# Patient Record
Sex: MALE | Race: WHITE | Employment: FULL TIME | URBAN - METROPOLITAN AREA
[De-identification: names, ages, dates, MRNs, and addresses within clinical notes are randomized per-mention and may not be internally consistent; named-entity substitution may affect disease eponyms.]

---

## 2017-12-18 ENCOUNTER — OFFICE VISIT (OUTPATIENT)
Dept: FAMILY MEDICINE CLINIC | Age: 57
End: 2017-12-18

## 2017-12-18 VITALS
HEIGHT: 65 IN | RESPIRATION RATE: 12 BRPM | OXYGEN SATURATION: 95 % | DIASTOLIC BLOOD PRESSURE: 88 MMHG | TEMPERATURE: 98 F | WEIGHT: 189.2 LBS | BODY MASS INDEX: 31.52 KG/M2 | SYSTOLIC BLOOD PRESSURE: 147 MMHG | HEART RATE: 62 BPM

## 2017-12-18 DIAGNOSIS — Z12.11 SCREENING FOR COLON CANCER: ICD-10-CM

## 2017-12-18 DIAGNOSIS — Z00.01 ENCOUNTER FOR WELL ADULT EXAM WITH ABNORMAL FINDINGS: Primary | ICD-10-CM

## 2017-12-18 DIAGNOSIS — M25.512 CHRONIC LEFT SHOULDER PAIN: ICD-10-CM

## 2017-12-18 DIAGNOSIS — Z13.29 SCREENING FOR THYROID DISORDER: ICD-10-CM

## 2017-12-18 DIAGNOSIS — Z13.1 SCREENING FOR DIABETES MELLITUS: ICD-10-CM

## 2017-12-18 DIAGNOSIS — D22.9 ATYPICAL NEVI: ICD-10-CM

## 2017-12-18 DIAGNOSIS — Z12.5 SCREENING FOR MALIGNANT NEOPLASM OF PROSTATE: ICD-10-CM

## 2017-12-18 DIAGNOSIS — Z13.220 SCREENING FOR HYPERLIPIDEMIA: ICD-10-CM

## 2017-12-18 DIAGNOSIS — Z13.0 SCREENING FOR DEFICIENCY ANEMIA: ICD-10-CM

## 2017-12-18 DIAGNOSIS — G89.29 CHRONIC LEFT SHOULDER PAIN: ICD-10-CM

## 2017-12-18 LAB
A-G RATIO,AGRAT: 1.7 RATIO (ref 1.1–2.6)
ALBUMIN SERPL-MCNC: 4.8 G/DL (ref 3.5–5)
ALP SERPL-CCNC: 68 U/L (ref 25–115)
ALT SERPL-CCNC: 28 U/L (ref 5–40)
ANION GAP SERPL CALC-SCNC: 16 MMOL/L
AST SERPL W P-5'-P-CCNC: 24 U/L (ref 10–37)
BILIRUB SERPL-MCNC: 0.3 MG/DL (ref 0.2–1.2)
BUN SERPL-MCNC: 11 MG/DL (ref 6–22)
CALCIUM SERPL-MCNC: 10.3 MG/DL (ref 8.4–10.4)
CHLORIDE SERPL-SCNC: 99 MMOL/L (ref 98–110)
CHOLEST SERPL-MCNC: 224 MG/DL (ref 110–200)
CO2 SERPL-SCNC: 24 MMOL/L (ref 20–32)
CREAT SERPL-MCNC: 0.7 MG/DL (ref 0.5–1.2)
GFRAA, 66117: >60
GFRNA, 66118: >60
GLOBULIN,GLOB: 2.8 G/DL (ref 2–4)
GLUCOSE SERPL-MCNC: 94 MG/DL (ref 70–99)
HCT VFR BLD AUTO: 44.2 % (ref 39.3–51.6)
HDLC SERPL-MCNC: 44 MG/DL (ref 40–59)
HGB BLD-MCNC: 15.2 G/DL (ref 13.1–17.2)
LDLC SERPL CALC-MCNC: 119 MG/DL (ref 50–99)
POTASSIUM SERPL-SCNC: 4.6 MMOL/L (ref 3.5–5.5)
PROT SERPL-MCNC: 7.6 G/DL (ref 6.4–8.3)
PSA SERPL-MCNC: 0.42 NG/ML
SODIUM SERPL-SCNC: 139 MMOL/L (ref 133–145)
TRIGL SERPL-MCNC: 309 MG/DL (ref 40–149)
TSH SERPL DL<=0.005 MIU/L-ACNC: 3.36 MCU/ML (ref 0.27–4.2)
VLDLC SERPL CALC-MCNC: 62 MG/DL (ref 8–30)

## 2017-12-18 NOTE — PROGRESS NOTES
1. Have you been to the ER, urgent care clinic since your last visit? Hospitalized since your last visit? No    2. Have you seen or consulted any other health care providers outside of the [address] [place] [address] since your last visit? Include any pap smears or colon screening. No      Subjective:   Forrest Ayon is a 65 y.o. male presenting for his annual checkup. ROS:  Feeling well. No dyspnea or chest pain on exertion. No abdominal pain, change in bowel habits, black or bloody stools. No urinary tract or prostatic symptoms. No neurological complaints. Specific concerns today: left shoulder pain. He fell this summer and it continues to hurt. He also has this mole that has changed shape and gotten bigger. .    There are no active problems to display for this patient. Not on File  Past Medical History:   Diagnosis Date    Hiatal hernia      Past Surgical History:   Procedure Laterality Date    HX HERNIA REPAIR      hiatal     Family History   Problem Relation Age of Onset    Cancer Father     Heart Disease Brother     Breast Cancer Maternal Grandmother      Social History   Substance Use Topics    Smoking status: Former Smoker     Packs/day: 2.00     Years: 20.00     Types: Cigarettes    Smokeless tobacco: Current User     Types: Chew    Alcohol use Yes      Comment: social             Objective:     Visit Vitals    /88 (BP 1 Location: Left arm, BP Patient Position: Sitting)    Pulse 62    Temp 98 °F (36.7 °C) (Oral)    Resp 12    Ht 5' 5\" (1.651 m)    Wt 189 lb 3.2 oz (85.8 kg)    SpO2 95%    BMI 31.48 kg/m2     The patient appears well, alert, oriented x 3, in no distress. ENT normal.  Neck supple. No adenopathy or thyromegaly. MARIAH. Lungs are clear, good air entry, no wheezes, rhonchi or rales. S1 and S2 normal, no murmurs, regular rate and rhythm. Abdomen is soft without tenderness, guarding, mass or organomegaly.  exam:  abdominal hernias.   Extremities show no edema, normal peripheral pulses. Neurological is normal without focal findings. Physical Exam   Musculoskeletal:        Right shoulder: Normal.        Left shoulder: He exhibits decreased range of motion, tenderness, effusion, crepitus, pain and decreased strength. Skin: Lesion and rash noted. No purpura noted. Rash is papular. Rash is not pustular and not vesicular. skin: on his back, right side, lateral aspect there is a nevi with a pendulous lesion that is callous. He has multiple actinic keratosis. Assessment/Plan:   healthy adult male  lose weight, increase physical activity, routine labs ordered. ICD-10-CM ICD-9-CM    1. Encounter for well adult exam with abnormal findings Z00.01 V70.0    2. Screening for colon cancer Z12.11 V76.51 REFERRAL TO UROLOGY   3. Screening for hyperlipidemia Z13.220 V77.91 LIPID PANEL      REFERRAL TO UROLOGY   4. Screening for deficiency anemia Z13.0 V78.1 HGB & HCT      REFERRAL TO UROLOGY   5. Screening for malignant neoplasm of prostate Z12.5 V76.44 PSA, DIAGNOSTIC (PROSTATE SPECIFIC AG)      REFERRAL TO UROLOGY   6. Screening for thyroid disorder Z13.29 V77.0 TSH 3RD GENERATION      REFERRAL TO UROLOGY   7. Screening for diabetes mellitus C78.4 K37.6 METABOLIC PANEL, COMPREHENSIVE      REFERRAL TO UROLOGY   8. Chronic left shoulder pain M25.512 719.41 REFERRAL TO ORTHOPEDICS    G89.29 338.29 MRI SHOULDER LT WO CONT   . PLAN  We discussed his left shoulder injury, symptoms and ref to ortho. Pt is aware that the MRI has to be approved by his insurance. We discussed diet and exercise. Pt given after visit summary.

## 2017-12-18 NOTE — MR AVS SNAPSHOT
Visit Information Date & Time Provider Department Dept. Phone Encounter #  
 12/18/2017 10:30 AM Sherry Piper NP [address] [place] [address] [id number] Upcoming Health Maintenance Date Due Hepatitis C Screening 1960 DTaP/Tdap/Td series (1 - Tdap) 7/5/1982 FOBT Q 1 YEAR AGE 50-75 7/5/2011 Influenza Age 5 to Adult 8/1/2017 Allergies as of 12/18/2017  Review Complete On: 12/18/2017 By: Sherry Piper NP Not on File Current Immunizations  Never Reviewed No immunizations on file. Not reviewed this visit You Were Diagnosed With   
  
 Codes Comments Encounter for well adult exam with abnormal findings    -  Primary ICD-10-CM: Z00.01 
ICD-9-CM: V70.0 Screening for colon cancer     ICD-10-CM: Z12.11 ICD-9-CM: V76.51 Screening for hyperlipidemia     ICD-10-CM: Z13.220 ICD-9-CM: V77.91 Screening for deficiency anemia     ICD-10-CM: Z13.0 ICD-9-CM: V78.1 Screening for malignant neoplasm of prostate     ICD-10-CM: Z12.5 ICD-9-CM: V76.44 Screening for thyroid disorder     ICD-10-CM: Z13.29 ICD-9-CM: V77.0 Screening for diabetes mellitus     ICD-10-CM: Z13.1 ICD-9-CM: V77.1 Chronic left shoulder pain     ICD-10-CM: M25.512, G89.29 ICD-9-CM: 719.41, 338.29 Vitals BP Pulse Temp Resp Height(growth percentile) Weight(growth percentile) 147/88 (BP 1 Location: Left arm, BP Patient Position: Sitting) 62 98 °F (36.7 °C) (Oral) 12 5' 5\" (1.651 m) 189 lb 3.2 oz (85.8 kg) SpO2 BMI Smoking Status 95% 31.48 kg/m2 Former Smoker BMI and BSA Data Body Mass Index Body Surface Area  
 31.48 kg/m 2 1.98 m 2 Your Updated Medication List  
  
Notice  As of 12/18/2017 11:05 AM  
 You have not been prescribed any medications. We Performed the Following REFERRAL TO ORTHOPEDICS [NZO739 Custom] REFERRAL TO UROLOGY [JUW358 Custom] To-Do List   
 12/18/2017 Lab:  HGB & HCT   
  
 12/18/2017 Lab:  LIPID PANEL   
  
 12/18/2017 Lab:  METABOLIC PANEL, COMPREHENSIVE   
  
 12/18/2017 Imaging:  MRI SHOULDER LT WO CONT   
  
 12/18/2017 Lab:  PSA, DIAGNOSTIC (PROSTATE SPECIFIC AG)   
  
 12/18/2017 Lab:  TSH 3RD GENERATION Referral Information Referral ID Referred By Referred To  
  
 [id number] [facility] GABY [address]   
   [facility], [address] Phone: [phone] Fax: [phone] Visits Status Start Date End Date 1 New Request 12/18/17 12/18/18 If your referral has a status of pending review or denied, additional information will be sent to support the outcome of this decision. Referral ID Referred By Referred To  
 [id number] [facility] [address] [facility] [place], [address] Phone: [phone] Fax: [phone] Visits Status Start Date End Date 1 New Request 12/18/17 12/18/18 If your referral has a status of pending review or denied, additional information will be sent to support the outcome of this decision. Introducing [facility]! [facility] introduces Xeround patient portal. Now you can access parts of your medical record, email your doctor's office, and request medication refills online. 1. In your internet browser, go to [email or web address] 2. Click on the First Time User? Click Here link in the Sign In box. You will see the New Member Sign Up page. 3. Enter your Xeround Access Code exactly as it appears below. You will not need to use this code after youve completed the sign-up process. If you do not sign up before the expiration date, you must request a new code. · Xeround Access Code: [email or web address] Expires: 3/18/2018 10:30 AM 
 
 4. Enter the last four digits of your Social Security Number (xxxx) and Date of Birth (mm/dd/yyyy) as indicated and click Submit. You will be taken to the next sign-up page. 5. Create a XipLink ID. This will be your XipLink login ID and cannot be changed, so think of one that is secure and easy to remember. 6. Create a XipLink password. You can change your password at any time. 7. Enter your Password Reset Question and Answer. This can be used at a later time if you forget your password. 8. Enter your e-mail address. You will receive e-mail notification when new information is available in [address] 9. Click Sign Up. You can now view and download portions of your medical record. 10. Click the Download Summary menu link to download a portable copy of your medical information. If you have questions, please visit the Frequently Asked Questions section of the XipLink website. Remember, XipLink is NOT to be used for urgent needs. For medical emergencies, dial 911. Now available from your iPhone and Android! Please provide this summary of care documentation to your next provider. If you have any questions after today's visit, please call [phone].

## 2017-12-19 ENCOUNTER — TELEPHONE (OUTPATIENT)
Dept: FAMILY MEDICINE CLINIC | Age: 57
End: 2017-12-19

## 2017-12-19 NOTE — TELEPHONE ENCOUNTER
----- Message from Michael Moody LPN sent at 55/57/2706  9:52 AM EST -----      ----- Message -----     From: Sylvia Fernandez NP     Sent: 12/19/2017   9:20 AM       To: [facility] Nurses    Please advise Pt that there is no signs of anemia. His kidney and liver functions are fine, no signs of diabetes. His TSH is in normal range. His PSA is normal.  His cholesterol numbers are off. His triglycerides are 309, his total cholesterol is 224 and his LDL is 119. I want him to focus on his triglycerides. He should eat more foods rich in Omega 3 and Niacin and walk more. He should eat less foods that are fried and fatty. I would like to see him in 6 months for an OV and fasting labs.

## 2017-12-19 NOTE — LETTER
12/19/2017 11:08 AM 
 
Mr. Leroy Acuna [address] [place] [id number] We are writing to inform you that your labs are normal except your cholesterol numbers are off.  triglycerides are 309, your total cholesterol is 224 and his LDL is 119. Keith would you like you to focus on your triglycerides. You should eat more foods rich in Omega 3 and Niacin and walk more. You should eat less foods that are fried and fatty. Aung Jones would like to see you in 6 months for an OV and fasting labs Sincerely, Vin Castaneda LPN

## 2017-12-19 NOTE — PROGRESS NOTES
Please advise Pt that there is no signs of anemia. His kidney and liver functions are fine, no signs of diabetes. His TSH is in normal range. His PSA is normal.  His cholesterol numbers are off. His triglycerides are 309, his total cholesterol is 224 and his LDL is 119. I want him to focus on his triglycerides. He should eat more foods rich in Omega 3 and Niacin and walk more. He should eat less foods that are fried and fatty. I would like to see him in 6 months for an OV and fasting labs.

## 2017-12-29 ENCOUNTER — HOSPITAL ENCOUNTER (OUTPATIENT)
Age: 57
Discharge: HOME | End: 2017-12-29
Attending: NURSE PRACTITIONER
Payer: COMMERCIAL

## 2017-12-29 DIAGNOSIS — G89.29 CHRONIC LEFT SHOULDER PAIN: ICD-10-CM

## 2017-12-29 DIAGNOSIS — M25.512 CHRONIC LEFT SHOULDER PAIN: ICD-10-CM

## 2017-12-29 PROCEDURE — 73221 MRI JOINT UPR EXTREM W/O DYE: CPT

## 2018-01-02 NOTE — PROGRESS NOTES
Please advise Pt that his MRI showed a large full thickness tear of the supraspinator and the infraspinator. I ref him to ortho.

## 2018-01-03 ENCOUNTER — TELEPHONE (OUTPATIENT)
Dept: FAMILY MEDICINE CLINIC | Age: 58
End: 2018-01-03

## 2018-01-03 NOTE — LETTER
1/11/2018 11:22 AM 
 
Mr. Monique Fletcher [address] [address] We are having trouble getting in contact with you. Please call our office at [phone]. Sincerely, Esperanza Kerns LPN

## 2018-01-03 NOTE — TELEPHONE ENCOUNTER
----- Message from Brody Harris NP sent at 1/2/2018  1:28 PM EST -----  Please advise Pt that his MRI showed a large full thickness tear of the supraspinator and the infraspinator. I ref him to ortho.

## 2018-01-10 NOTE — TELEPHONE ENCOUNTER
----- Message from Felicita Rayo NP sent at 1/2/2018  1:28 PM EST -----  Please advise Pt that his MRI showed a large full thickness tear of the supraspinator and the infraspinator. I ref him to ortho.

## 2018-01-11 NOTE — TELEPHONE ENCOUNTER
----- Message from Rajeev Cruz NP sent at 1/2/2018  1:28 PM EST -----  Please advise Pt that his MRI showed a large full thickness tear of the supraspinator and the infraspinator. I ref him to ortho.

## 2018-01-16 ENCOUNTER — OFFICE VISIT (OUTPATIENT)
Dept: ORTHOPEDIC SURGERY | Age: 58
End: 2018-01-16

## 2018-01-16 VITALS
BODY MASS INDEX: 31.49 KG/M2 | WEIGHT: 189 LBS | OXYGEN SATURATION: 94 % | TEMPERATURE: 97.6 F | DIASTOLIC BLOOD PRESSURE: 89 MMHG | HEIGHT: 65 IN | SYSTOLIC BLOOD PRESSURE: 140 MMHG | HEART RATE: 72 BPM

## 2018-01-16 DIAGNOSIS — M75.02 ADHESIVE CAPSULITIS OF LEFT SHOULDER: ICD-10-CM

## 2018-01-16 DIAGNOSIS — M75.122 COMPLETE TEAR OF LEFT ROTATOR CUFF: Primary | ICD-10-CM

## 2018-01-16 RX ORDER — TRIAMCINOLONE ACETONIDE 40 MG/ML
40 INJECTION, SUSPENSION INTRA-ARTICULAR; INTRAMUSCULAR ONCE
Qty: 1 ML | Refills: 0
Start: 2018-01-16 | End: 2018-01-16

## 2018-01-16 NOTE — PROGRESS NOTES
Clarissa Mcgovern  1960   Chief Complaint   Patient presents with    Shoulder Pain     LEFT        HISTORY OF PRESENT ILLNESS  Clarissa Mcgovern is a 65 y.o. male who presents today for evaluation of left shoulder pain. he rates his pain 7/10 today. Pain has been present since 7/31/17 when he had a fall over a pipe. Patient describes the pain as aching and sharp that is Constant in nature. Symptoms are worse with certain movements of the arm such as overhead motions, Activity and is better with  Rest. Associated symptoms include clicking, stiffness. Since problem started, it: has worsened. Pain does wake patient up at night. Has taken no recent medications for the problem. Has tried following treatments: Injections:NO; Brace:NO; Therapy:NO; Cane/Crutch:NO       Allergies   Allergen Reactions    Pcn [Penicillins] Hives        Past Medical History:   Diagnosis Date    Hiatal hernia       Social History     Social History    Marital status: SINGLE     Spouse name: N/A    Number of children: N/A    Years of education: N/A     Occupational History    Not on file. Social History Main Topics    Smoking status: Former Smoker     Packs/day: 2.00     Years: 20.00     Types: Cigarettes    Smokeless tobacco: Current User     Types: Chew    Alcohol use Yes      Comment: social    Drug use: No    Sexual activity: Yes     Partners: Female     Birth control/ protection: None     Other Topics Concern    Not on file     Social History Narrative      Past Surgical History:   Procedure Laterality Date    HX HERNIA REPAIR      hiatal      Family History   Problem Relation Age of Onset    Cancer Father     Heart Disease Brother     Breast Cancer Maternal Grandmother       Current Outpatient Prescriptions   Medication Sig    triamcinolone acetonide (KENALOG) 40 mg/mL injection 1 mL by IntraMUSCular route once for 1 dose. No current facility-administered medications for this visit.         REVIEW OF SYSTEM   Patient denies: Weight loss, Fever/Chills, HA, Visual changes, Fatigue, Chest pain, SOB, Abdominal pain, N/V/D/C, Blood in stool or urine, Edema. Pertinent positive as above in HPI. All others were negative    PHYSICAL EXAM:   Visit Vitals    /89    Pulse 72    Temp 97.6 °F (36.4 °C) (Oral)    Ht 5' 5\" (1.651 m)    Wt 189 lb (85.7 kg)    SpO2 94%    BMI 31.45 kg/m2     The patient is a well-developed, well-nourished male   in no acute distress. The patient is alert and oriented times three. The patient is alert and oriented times three. Mood and affect are normal.  LYMPHATIC: lymph nodes are not enlarged and are within normal limits  SKIN: normal in color and non tender to palpation. There are no bruises or abrasions noted. NEUROLOGICAL: Motor sensory exam is within normal limits. Reflexes are equal bilaterally. There is normal sensation to pinprick and light touch  MUSCULOSKELETAL:  Examination Left shoulder   Skin Intact   AC joint tenderness -   Biceps tenderness -   Forward flexion/Elevation ROM 90   Active abduction    Glenohumeral abduction 60   External rotation ROM 30   Internal rotation ROM 0   Apprehension -   Robinsons Relocation -   Jerk -   Load and Shift -   Obriens -   Speeds -   Impingement sign +   Supraspinatus/Empty Can +   External Rotation Strength -, 5/5   Lift Off/Belly Press -, 5/5   Neurovascular Intact     PROCEDURE: After sterile prep, 6 cc of Xylocaine and 1 cc of Kenalog were injected into the left shoulder.        [facility]  OFFICE PROCEDURE PROGRESS NOTE        Chart reviewed for the following:  Mario Agudelo MD, have reviewed the History, Physical and updated the Allergic reactions for [address] [place] [address] performed immediately prior to start of procedure:  aMrio Agudelo MD, have performed the following reviews on Harris Fields prior to the start of the procedure: * Patient was identified by name and date of birth   * Agreement on procedure being performed was verified  * Risks and Benefits explained to the patient  * Procedure site verified and marked as necessary  * Patient was positioned for comfort  * Consent was signed and verified     Time: 11:26 AM    Date of procedure: 1/16/2018    Procedure performed by:  Bishop Charlotte MD    Provider assisted by: (see medication administration)    How tolerated by patient: tolerated the procedure well with no complications    Comments: none      IMAGING: MRI of the left shoulder dated 12/29/17 was reviewed and read:   IMPRESSION:  1. Large full-thickness tear of the supraspinatus and infraspinatus with  moderate atrophy and 3.3 cm of retraction. 2.  Tendinopathy of the subscapularis. IMPRESSION:      ICD-10-CM ICD-9-CM    1. Complete tear of left rotator cuff M75.122 727.61 REFERRAL TO PHYSICAL THERAPY      TRIAMCINOLONE ACETONIDE INJ      triamcinolone acetonide (KENALOG) 40 mg/mL injection      DRAIN/INJECT LARGE JOINT/BURSA   2. Adhesive capsulitis of left shoulder M75.02 726.0 REFERRAL TO PHYSICAL THERAPY      TRIAMCINOLONE ACETONIDE INJ      triamcinolone acetonide (KENALOG) 40 mg/mL injection      DRAIN/INJECT LARGE JOINT/BURSA        PLAN:  1. The patient has developed an adhesive capsulitis in the left shoulder. I would like him to work on ROM before we can proceed with surgery to repair the MRI documented rotator cuff tear. Risk factors include: n/a  2. Yes cortisone injection indicated today L SHOULDER  3. Yes Physical Therapy indicated today  4. No diagnostic test indicated today  5. No durable medical equipment indicated today  6. No referral indicated today   7. No medications indicated today  8.  No Narcotic indicated today       RTC 4 weeks  Follow-up Disposition: Not on File    Scribed by [facility]) as dictated by Bishop Charlotte MD    I, Dr. Bishop Porter, confirm that all documentation is accurate.     Luis Medley M.D.   [facility]

## 2018-01-19 ENCOUNTER — HOSPITAL ENCOUNTER (OUTPATIENT)
Dept: PHYSICAL THERAPY | Age: 58
Discharge: HOME | End: 2018-01-19
Payer: COMMERCIAL

## 2018-01-19 PROCEDURE — 97110 THERAPEUTIC EXERCISES: CPT

## 2018-01-19 PROCEDURE — 97161 PT EVAL LOW COMPLEX 20 MIN: CPT

## 2018-01-19 NOTE — PROGRESS NOTES
[facility]  [address]  [place], [address]  [phone] fax    Plan of Care/ Statement of Necessity for Physical Therapy Services    Patient name: Harris Fields Start of Care: 2018   Referral source: Ramila Charles,* : 1960    Medical Diagnosis: Left shoulder pain [M25.512]   Onset Date:2017    Treatment Diagnosis: L shoulder adhesive capsulitis and RTC tear   Prior Hospitalization: see medical history Provider#: [id number]   Medications: Verified on Patient summary List    Comorbidities: + smoker   Prior Level of Function: functionally I with daily activities     The Plan of Care and following information is based on the information from the initial evaluation. Assessment/ key information: 64 y/o male presents with c/o L shoulder pain and weakness. Pt reports slipping and falling at work in July and landing on the L shoulder. He has continued to work since that time but reports he has not been able to lift his L arm overhead. MRI was performed and shows RTC tear per pt report. He also now demonstrates signs and symptoms consistent with adhesive capsulitis. L shoulder AROM and PROM is restricted in all planes of motion. + capsular tightness noted L GHJ. Pt will benefit from PT to improve GHJ mobility and ROM of the L shoulder. He will most likely require surgery for RTC pathology. Evaluation Complexity History MEDIUM  Complexity : 1-2 comorbidities / personal factors will impact the outcome/ POC ; Examination MEDIUM Complexity : 3 Standardized tests and measures addressing body structure, function, activity limitation and / or participation in recreation  ;Presentation MEDIUM Complexity : Evolving with changing characteristics  ; Clinical Decision Making MEDIUM Complexity : FOTO score of 26-74  Overall Complexity Rating: MEDIUM  Problem List: pain affecting function, decrease ROM, decrease strength, decrease ADL/ functional abilitiies, decrease activity tolerance and decrease flexibility/ joint mobility   Treatment Plan may include any combination of the following: Therapeutic exercise, Therapeutic activities, Neuromuscular re-education, Physical agent/modality, Manual therapy, Patient education and Self Care training  Patient / Family readiness to learn indicated by: asking questions, trying to perform skills and interest  Persons(s) to be included in education: patient (P)  Barriers to Learning/Limitations: None  Patient Goal (s): normal mobility  Patient Self Reported Health Status: good  Rehabilitation Potential: fair    Short Term Goals: To be accomplished in 1 weeks:   1. Pt will be I and compliant with HEP  Long Term Goals: To be accomplished in 4 weeks:   1. Improve FOTO to predicted outcome for improved ability for functional tasks   2. Improve L shoulder PROM flexion to 130 degrees for improve ability for future tasks   3. Improve L shoulder PROM ER to 45 degrees for improved ability for use of L UE   4. Improve L shoulder PROM IR to 60 degrees for improved ability for ADL. Frequency / Duration: Patient to be seen 2-3 times per week for 4 weeks. Patient/ Caregiver education and instruction: Diagnosis, prognosis, self care and exercises   [x]  Plan of care has been reviewed with PTA    Amanda Alvarez, PT 1/19/2018 4:28 PM    ________________________________________________________________________    I certify that the above Therapy Services are being furnished while the patient is under my care. I agree with the treatment plan and certify that this therapy is necessary.     [de-identified] Signature:____________________  Date:____________Time: _________    Please sign and return to [facility] [place] [address] [address]  [place], [address]  [phone] fax

## 2018-01-19 NOTE — PROGRESS NOTES
PT DAILY TREATMENT NOTE - [facility]     Patient Name: Jesus Isabel  Date:2018  : 1960  [x]  Patient  Verified  Payor: Elizabeth Burr / Plan: [facility] [employer] / Product Type: Commerical /    In time:3:40  Out time:4:20  Total Treatment Time (min): 40  Total Timed Codes (min): 10  1:1 Treatment Time ( only): 40   Visit #: 1 of     Treatment Area: Left shoulder pain [M25.512]    SUBJECTIVE  Pain Level (0-10 scale): 4  Any medication changes, allergies to medications, adverse drug reactions, diagnosis change, or new procedure performed?: [x] No    [] Yes (see summary sheet for update)  Subjective functional status/changes:   [] No changes reported       OBJECTIVE    Modality rationale:    Min Type Additional Details    [] Estim:  []Unatt       []IFC  []Premod                        []Other:  []w/ice   []w/heat  Position:  Location:    [] Estim: []Att    []TENS instruct  []NMES                    []Other:  []w/US   []w/ice   []w/heat  Position:  Location:    []  Traction: [] Cervical       []Lumbar                       [] Prone          []Supine                       []Intermittent   []Continuous Lbs:  [] before manual  [] after manual    []  Ultrasound: []Continuous   [] Pulsed                           []1MHz   []3MHz W/cm2:  Location:    []  Iontophoresis with dexamethasone         Location: [] Take home patch   [] In clinic    []  Ice     []  heat  []  Ice massage  []  Laser   []  Anodyne Position:  Location:    []  Laser with stim  []  Other:  Position:  Location:    []  Vasopneumatic Device Pressure:       [] lo [] med [] hi   Temperature: [] lo [] med [] hi   [] Skin assessment post-treatment:  []intact []redness- no adverse reaction    []redness - adverse reaction:     30 min [x]Eval                  []Re-Eval       10 min Therapeutic Exercise:  [] See flow sheet : HEP   Rationale: increase ROM to improve the patients ability to improve use of L UE            With   [] TE   [] TA   [] neuro   [] other: Patient Education: [x] Review HEP    [] Progressed/Changed HEP based on:   [] positioning   [] body mechanics   [] transfers   [] heat/ice application    [] other:      Other Objective/Functional Measures:       Pain Level (0-10 scale) post treatment: 4    ASSESSMENT/Changes in Function:      Patient will continue to benefit from skilled PT services to modify and progress therapeutic interventions, address functional mobility deficits, address ROM deficits, address strength deficits, analyze and address soft tissue restrictions, analyze and cue movement patterns and analyze and modify body mechanics/ergonomics to attain remaining goals.      []  See Plan of Care  []  See progress note/recertification  []  See Discharge Summary         Progress towards goals / Updated goals:  Per POC    PLAN  []  Upgrade activities as tolerated     [x]  Continue plan of care  []  Update interventions per flow sheet       []  Discharge due to:_  []  Other:_      Kathleen Laureano, PT 1/19/2018  4:19 PM    Future Appointments  Date Time Provider Syeda Manzanares   1/23/2018 2:30 PM Sandy Camp PTA [facility]   1/26/2018 4:00 PM Kathleen Laureano, PT [facility]   1/30/2018 4:00 PM Sandy Camp PTA [facility]   2/2/2018 4:00 PM Vickie Cortés, PTA [facility]   2/5/2018 1:30 PM Mendoza Avila MD [address] [place] [address]   2/6/2018 5:00 PM Sandy Camp PTA [facility]   2/9/2018 4:00 PM Vickie Cortés, PTA [facility]   2/13/2018 4:00 PM Sandy Camp PTA [facility]   2/16/2018 3:30 PM Kathleen Laureano, PT [facility]

## 2018-01-23 ENCOUNTER — HOSPITAL ENCOUNTER (OUTPATIENT)
Dept: PHYSICAL THERAPY | Age: 58
Discharge: HOME | End: 2018-01-23
Payer: COMMERCIAL

## 2018-01-23 PROCEDURE — 97140 MANUAL THERAPY 1/> REGIONS: CPT

## 2018-01-23 PROCEDURE — 97110 THERAPEUTIC EXERCISES: CPT

## 2018-01-23 NOTE — PROGRESS NOTES
PT DAILY TREATMENT NOTE     Patient Name: Azael Gacria  Date:2018  : 1960  [x]  Patient  Verified  Payor: Sandeep Guillen / Plan: [facility] / Product Type: Commerical /    In time:2:30  Out time:3:20  Total Treatment Time (min): 50  Visit #: 2 of     Treatment Area: Left shoulder pain [M25.512]    SUBJECTIVE  Pain Level (0-10 scale): 5/10  Any medication changes, allergies to medications, adverse drug reactions, diagnosis change, or new procedure performed?: [x] No    [] Yes (see summary sheet for update)  Subjective functional status/changes:   [x] No changes reported     OBJECTIVE     42 min Therapeutic Exercise:  [x] See flow sheet :   Rationale: increase ROM, increase strength and increase proprioception to improve the patients ability to perform ADL's.    8 min Manual Therapy:  (L) ST/GH joint mobs, pec release, shoulder PROM. Rationale: decrease pain, increase ROM and increase tissue extensibility to improve ease of performing ADL's. With   [x] TE   [] TA   [] neuro   [] other: Patient Education: [x] Review HEP    [] Progressed/Changed HEP based on:   [] positioning   [] body mechanics   [] transfers   [] heat/ice application    [] other:      Other Objective/Functional Measures: Initiated exercises per flow sheet. Pain Level (0-10 scale) post treatment: 2/10    ASSESSMENT/Changes in Function: First F/U visit. PT reported a decrease in tension and pain level post-treatment. Patient will continue to benefit from skilled PT services to modify and progress therapeutic interventions, address functional mobility deficits, address ROM deficits, address strength deficits, analyze and address soft tissue restrictions and analyze and modify body mechanics/ergonomics to attain remaining goals. [x]  See Plan of Care  []  See progress note/recertification  []  See Discharge Summary         Progress towards goals / Updated goals:  Short Term Goals:  To be accomplished in 1 weeks: 1. Pt will be I and compliant with HEP - Pt reports HEP compliance. 1/23/2018  Long Term Goals: To be accomplished in 4 weeks:                         1. Improve FOTO to predicted outcome for improved ability for functional tasks                         2. Improve L shoulder PROM flexion to 130 degrees for improve ability for future tasks                         3. Improve L shoulder PROM ER to 45 degrees for improved ability for use of L UE                         4. Improve L shoulder PROM IR to 60 degrees for improved ability for ADL.    Frequency / Duration: Patient to be seen 2-3 times per week for 4 weeks.     PLAN  []  Upgrade activities as tolerated     [x]  Continue plan of care  []  Update interventions per flow sheet       []  Discharge due to:_  []  Other:_      Guy Khan PTA 1/23/2018  2:23 PM    Future Appointments  Date Time Provider Syeda Manzanares   1/23/2018 2:30 PM Guy Khan PTA [facility]   1/26/2018 4:00 PM Javier Brewer, PT [facility]   1/30/2018 4:00 PM Guy Khan PTA [facility]   2/2/2018 4:00 PM Analy David, PTA [facility]   2/5/2018 1:30 PM Chen Cote MD [address] [place] [address]   2/6/2018 5:00 PM Guy Khan PTA [facility]   2/9/2018 4:00 PM Analy MAGDIEL Hernandez [facility]   2/13/2018 4:00 PM Guy Khan PTA [facility]   2/16/2018 3:30 PM Javier Brewer, PT [facility]

## 2018-01-26 ENCOUNTER — HOSPITAL ENCOUNTER (OUTPATIENT)
Dept: PHYSICAL THERAPY | Age: 58
Discharge: HOME | End: 2018-01-26
Payer: COMMERCIAL

## 2018-01-26 PROCEDURE — 97140 MANUAL THERAPY 1/> REGIONS: CPT

## 2018-01-26 PROCEDURE — 97110 THERAPEUTIC EXERCISES: CPT

## 2018-01-26 NOTE — PROGRESS NOTES
PT DAILY TREATMENT NOTE 3-16    Patient Name: Theophilus Harada  Date:2018  : 1960  [x]  Patient  Verified  Payor: Trev Pitts / Plan: [facility] Product Type: Commerical /    In time:3:30  Out time:4:07  Total Treatment Time (min): 37  Visit #: 3 of 8-12    Treatment Area: Left shoulder pain [M25.512]    SUBJECTIVE  Pain Level (0-10 scale): 1  Any medication changes, allergies to medications, adverse drug reactions, diagnosis change, or new procedure performed?: [x] No    [] Yes (see summary sheet for update)  Subjective functional status/changes:   [] No changes reported  Patient reports no new complaints. OBJECTIVE  29 min Therapeutic Exercise:  [x] See flow sheet :   Rationale: increase ROM, increase strength and improve coordination to improve the patients ability to increase ease with ADLs    8 min Manual Therapy:  Left STJ mobs, left grade II inferior/posterior GHJ mobs, PROM left shoulder   Rationale: decrease pain, increase ROM and increase tissue extensibility to ease ADL tolerance           With   [] TE   [] TA   [] neuro   [] other: Patient Education: [x] Review HEP    [] Progressed/Changed HEP based on:   [] positioning   [] body mechanics   [] transfers   [] heat/ice application    [] other:      Other Objective/Functional Measures:   Cues to decrease muscle guarding with manual     Pain Level (0-10 scale) post treatment: 2    ASSESSMENT/Changes in Function:   Improved range post manual. Pain is low today. Patient will continue to benefit from skilled PT services to modify and progress therapeutic interventions, address functional mobility deficits, address ROM deficits, address strength deficits, analyze and address soft tissue restrictions, analyze and cue movement patterns, analyze and modify body mechanics/ergonomics and assess and modify postural abnormalities to attain remaining goals.      []  See Plan of Care  []  See progress note/recertification  []  See Discharge Summary         Progress towards goals / Updated goals:  Short Term Goals: To be accomplished in 1 weeks:                         1. Pt will be I and compliant with HEP - Pt reports HEP compliance. 1/23/2018  Long Term Goals: To be accomplished in 4 weeks:                         0. Improve FOTO to predicted outcome for improved ability for functional tasks                         2. Improve L shoulder PROM flexion to 130 degrees for improve ability for future tasks                         3. Improve L shoulder PROM ER to 45 degrees for improved ability for use of L UE                         4. Improve L shoulder PROM IR to 60 degrees for improved ability for ADL.          PLAN  []  Upgrade activities as tolerated     [x]  Continue plan of care  []  Update interventions per flow sheet       []  Discharge due to:_  []  Other:_      Luis Da Silva 1/26/2018  3:23 PM    Future Appointments  Date Time Provider Syeda Manzanares   1/26/2018 3:30 PM Luis Da Silva [facility]   1/30/2018 4:00 PM Asuncion Howard PTA [facility]   2/2/2018 4:00 PM Natalie Dover PTA [facility]   2/5/2018 1:30 PM Talita Fierro MD [address] [place] [address]   2/6/2018 5:00 PM Asuncion Howard PTA [facility]   2/9/2018 4:00 PM Natalie Dover PTA [facility]   2/13/2018 4:00 PM Asuncion Howard PTA [facility]   2/16/2018 3:30 PM Shelly Presley, LUZ MARINA [facility]

## 2018-01-30 ENCOUNTER — HOSPITAL ENCOUNTER (OUTPATIENT)
Dept: PHYSICAL THERAPY | Age: 58
Discharge: HOME | End: 2018-01-30
Payer: COMMERCIAL

## 2018-01-30 PROCEDURE — 97140 MANUAL THERAPY 1/> REGIONS: CPT

## 2018-01-30 PROCEDURE — 97110 THERAPEUTIC EXERCISES: CPT

## 2018-01-30 NOTE — PROGRESS NOTES
PT DAILY TREATMENT NOTE     Patient Name: Moreno Josue  Date:2018  : 1960  [x]  Patient  Verified  Payor: Yolanda Millard / Plan: [facility] [employer]  [facility] PT / Product Type: Commerical /    In time:3:59  Out time:4:54  Total Treatment Time (min): 55  Visit #: 4 of     Treatment Area: Left shoulder pain [M25.512]    SUBJECTIVE  Pain Level (0-10 scale): 310  Any medication changes, allergies to medications, adverse drug reactions, diagnosis change, or new procedure performed?: [x] No    [] Yes (see summary sheet for update)  Subjective functional status/changes:   [] No changes reported  \"I can it's better, feeling a bit looser. \"    OBJECTIVE    47 min Therapeutic Exercise:  [x] See flow sheet :   Rationale: increase ROM, increase strength and increase proprioception to improve the patients ability to perform ADL's.    8 min Manual Therapy:  (L) ST/GH joint mobs, pec release, DTM (L) UT, deltoid. Shoulder PROM. Rationale: decrease pain, increase ROM, increase tissue extensibility and decrease trigger points to improve activity tolerance. With   [x] TE   [] TA   [] neuro   [] other: Patient Education: [x] Review HEP    [] Progressed/Changed HEP based on:   [] positioning   [] body mechanics   [] transfers   [] heat/ice application    [] other:      Other Objective/Functional Measures: PROM (L) Shoulder flexion 125 degrees. Pain Level (0-10 scale) post treatment: 3/10    ASSESSMENT/Changes in Function: Occasional cavitations during (L) shoulder PROM, pt stated \"feels better after the pop's. \"    Patient will continue to benefit from skilled PT services to modify and progress therapeutic interventions, address functional mobility deficits, address ROM deficits, address strength deficits, analyze and address soft tissue restrictions and analyze and modify body mechanics/ergonomics to attain remaining goals.      [x]  See Plan of Care  []  See progress note/recertification  []  See Discharge Summary         Progress towards goals / Updated goals:  Short Term Goals: To be accomplished in 1 weeks:                         1. Pt will be I and compliant with HEP - Pt reports HEP compliance. 1/23/2018  Long Term Goals: To be accomplished in 4 weeks:                         0. Improve FOTO to predicted outcome for improved ability for functional tasks                         2. Improve L shoulder PROM flexion to 130 degrees for improve ability for future tasks - Progressing, 125 degrees. 1/30/2018                         3. Improve L shoulder PROM ER to 45 degrees for improved ability for use of L UE                         4. Improve L shoulder PROM IR to 60 degrees for improved ability for ADL.      PLAN  []  Upgrade activities as tolerated     [x]  Continue plan of care  []  Update interventions per flow sheet       []  Discharge due to:_  []  Other:_      Solis Khan PTA 1/30/2018  4:09 PM    Future Appointments  Date Time Provider Syeda Manzanares   2/2/2018 4:00 PM Maria Elena Ayon PTA [facility]   2/5/2018 1:30 PM Heidy Ochoa MD [address] [place] [address]   2/6/2018 5:00 PM Solis Khan PTA [facility]   2/9/2018 4:00 PM Maria Elena Ayon PTA [facility]   2/13/2018 4:00 PM Solis Khan PTA [facility]   2/16/2018 3:30 PM Stacie Dang, PT [facility]

## 2018-02-02 ENCOUNTER — HOSPITAL ENCOUNTER (OUTPATIENT)
Dept: PHYSICAL THERAPY | Age: 58
Discharge: HOME | End: 2018-02-02
Payer: COMMERCIAL

## 2018-02-02 PROCEDURE — 97140 MANUAL THERAPY 1/> REGIONS: CPT

## 2018-02-02 PROCEDURE — 97110 THERAPEUTIC EXERCISES: CPT

## 2018-02-02 NOTE — PROGRESS NOTES
PT DAILY TREATMENT NOTE     Patient Name: Silvina Stake  Date:2018  : 1960  [x]  Patient  Verified  Payor: Joseline Crespo / [facility] Product Type: Commerical /    In time:4:00  Out time:4:40  Total Treatment Time (min): 40  Visit #: 5 of     Treatment Area: Left shoulder pain [M25.512]    SUBJECTIVE  Pain Level (0-10 scale): 2-3/10  Any medication changes, allergies to medications, adverse drug reactions, diagnosis change, or new procedure performed?: [x] No    [] Yes (see summary sheet for update)  Subjective functional status/changes:   [] No changes reported  Pt reports no new complaints. Pt reports compliance with HEP. OBJECTIVE    32 min Therapeutic Exercise:  [x] See flow sheet :   Rationale: increase ROM and increase strength to improve the patients ability to tolerate ADLs. 8 min Manual Therapy:  PROM L shoulder   Rationale: decrease pain, increase ROM and increase tissue extensibility to improve tolerance to ADLs. With   [] TE   [] TA   [] neuro   [] other: Patient Education: [x] Review HEP    [] Progressed/Changed HEP based on:   [] positioning   [] body mechanics   [] transfers   [] heat/ice application    [] other:      Other Objective/Functional Measures: Pt guarding with all PROM. Pain Level (0-10 scale) post treatment: 0/10    ASSESSMENT/Changes in Function: Pt has continued improved available PROM but continues to have pain at end range. Patient will continue to benefit from skilled PT services to modify and progress therapeutic interventions, address functional mobility deficits, address ROM deficits, address strength deficits, analyze and address soft tissue restrictions, analyze and cue movement patterns and analyze and modify body mechanics/ergonomics to attain remaining goals.      []  See Plan of Care  []  See progress note/recertification  []  See Discharge Summary         Progress towards goals / Updated goals:  Short Term Goals: To be accomplished in [address] [place] [address]:                         1. Pt will be I and compliant with HEP - Pt reports HEP compliance. 1/23/2018  Long Term Goals: To be accomplished in 4 weeks:                         2. Improve FOTO to predicted outcome for improved ability for functional tasks                         2. Improve L shoulder PROM flexion to 130 degrees for improve ability for future tasks - Progressing, 125 degrees. 1/30/2018                         3. Improve L shoulder PROM ER to 45 degrees for improved ability for use of L UE                         4. Improve L shoulder PROM IR to 60 degrees for improved ability for ADL.      PLAN  []  Upgrade activities as tolerated     [x]  Continue plan of care  []  Update interventions per flow sheet       []  Discharge due to:_  []  Other:_      Felix Valadez PTA 2/2/2018  4:15 PM    Future Appointments  Date Time Provider [facility]   2/5/2018 1:30 PM Anyi Davies MD [address] [place] [address]   2/6/2018 5:00 PM Erica Zamora PTA [facility]   2/9/2018 4:00 PM Felix Valadez PTA [facility]   2/13/2018 4:00 PM Erica Zamora PTA [facility]   2/16/2018 3:30 PM Elizabeth Slater PT [facility]

## 2018-02-05 ENCOUNTER — OFFICE VISIT (OUTPATIENT)
Dept: UROLOGY | Age: 58
End: 2018-02-05

## 2018-02-05 VITALS
HEIGHT: 65 IN | HEART RATE: 79 BPM | BODY MASS INDEX: 31.16 KG/M2 | SYSTOLIC BLOOD PRESSURE: 136 MMHG | OXYGEN SATURATION: 96 % | WEIGHT: 187 LBS | DIASTOLIC BLOOD PRESSURE: 73 MMHG

## 2018-02-05 DIAGNOSIS — N40.0 BENIGN PROSTATIC HYPERPLASIA WITHOUT LOWER URINARY TRACT SYMPTOMS: Primary | ICD-10-CM

## 2018-02-05 LAB
BILIRUB UR QL STRIP: NEGATIVE
GLUCOSE UR-MCNC: NEGATIVE MG/DL
KETONES P FAST UR STRIP-MCNC: NEGATIVE MG/DL
PH UR STRIP: 7.5 [PH] (ref 4.6–8)
PROT UR QL STRIP: NEGATIVE
SP GR UR STRIP: 1.01 (ref 1–1.03)
UA UROBILINOGEN AMB POC: NORMAL (ref 0.2–1)
URINALYSIS CLARITY POC: CLEAR
URINALYSIS COLOR POC: YELLOW
URINE BLOOD POC: NEGATIVE
URINE LEUKOCYTES POC: NEGATIVE
URINE NITRITES POC: NEGATIVE

## 2018-02-05 NOTE — PATIENT INSTRUCTIONS
Urine Test: About This Test  What is it? A urine test checks the color, clarity (clear or cloudy), odor, concentration, and acidity (pH) of your urine. It also checks your levels of protein, sugar, blood cells, or other substances in your urine. This test is sometimes called a urinalysis. Why is this test done? A urine test may be done:  · To check for a disease or infection of the urinary tract. The urinary tract includes the kidneys, the tubes that carry urine from the kidneys to the bladder (ureters), and the bladder. It also includes the tube that carries urine from the bladder to outside the body (urethra). · To check the treatment of conditions such as diabetes, kidney stones, a urinary tract infection (UTI), high blood pressure, or some kidney or liver diseases. How can you prepare for the test?  · Before the test, don't eat foods that can change the color of your urine. Examples of these include blackberries, beets, and rhubarb. · Don't do heavy exercise before the test.  · Tell your doctor if you are menstruating or close to starting your period. Your doctor may want to wait to do the test.  · Tell your doctor about all the nonprescription and prescription medicines and herbs or other supplements you take. Some of these can affect the results of this test.  What happens during the test?  A urine test can be done in your doctor's office, clinic, or lab. Or you may be asked to collect a urine sample at home. Then you can take it to the office or lab for testing. Clean-catch midstream urine collection  · Wash your hands before you start. · If the cup you are given has a lid, remove it carefully. Set it down with the inner surface up. Don't touch the inside of the cup with your fingers. · Clean the area around your genitals. ¨ For men: Pull back the foreskin, if present. Clean the head of your penis with medicated towelettes or swabs.   ¨ For women: Spread open the genital folds of skin with one hand. Then use medicated towelettes or swabs in your other hand to clean the area where urine comes out (the urethra). Wipe the area from front to back. · Start urinating into the toilet or urinal. A woman should hold apart the genital folds of skin while she urinates. · After the urine has flowed for several seconds, place the cup into the urine stream. Collect about 2 ounces of urine without stopping your flow of urine. · Don't touch the rim of the cup to your genital area. Don't get toilet paper, pubic hair, stool (feces), menstrual blood, or anything else in the urine sample. · Finish urinating into the toilet or urinal.  · Carefully replace and tighten the lid on the cup, and then return it to the lab. If you are collecting the urine at home and can't get it to the lab in an hour, refrigerate it. Double-voided urine sample collection  This method collects the urine your body is making right now. · Urinate into the toilet or urinal. Don't collect any of this urine. · Drink a large glass of water, and wait about 30 to 40 minutes. · Then get a urine sample. Follow the instructions above for collecting a clean-catch urine sample. · Take the urine sample to the lab. If you are collecting the urine at home and can't get it to the lab in an hour, refrigerate it. Follow-up care is a key part of your treatment and safety. Be sure to make and go to all appointments, and call your doctor if you are having problems. It's also a good idea to keep a list of the medicines you take. Ask your doctor when you can expect to have your test results. Where can you learn more? Go to http://kenna-marline.info/. Enter R266 in the search box to learn more about \"Urine Test: About This Test.\"  Current as of: October 14, 2016  Content Version: 11.4  © 5830-8007 [employer], [employer].  Care instructions adapted under license by [employer] (which disclaims liability or warranty for this information). If you have questions about a medical condition or this instruction, always ask your healthcare professional. [place] [address] any warranty or liability for your use of this information.

## 2018-02-05 NOTE — PROGRESS NOTES
Chief Complaint   Patient presents with    New Patient       HISTORY OF PRESENT ILLNESS:  Kaveh Licea is a 65 y.o. male who presents today for prostate exam and evaluation. He has been followed by his primary care physician and a PSA has been reported as 0.4. He has no significant history of past prostate or urinary difficulties and has had nobody in the family that he is aware of with prostate cancer. He is on no medication and really has no significant symptoms. He said he was checked here by someone in this office about 10 or 15 years ago. Today he has nocturia 0-1, no hesitancy, hematuria, or episodes of retention. ROS unremarkable and already dictated on the chart. Past Medical History:   Diagnosis Date    Hiatal hernia        Past Surgical History:   Procedure Laterality Date    HX HERNIA REPAIR      hiatal       Social History   Substance Use Topics    Smoking status: Former Smoker     Packs/day: 2.00     Years: 20.00     Types: Cigarettes    Smokeless tobacco: Current User     Types: Chew    Alcohol use Yes      Comment: social       Allergies   Allergen Reactions    Pcn [Penicillins] Hives       Family History   Problem Relation Age of Onset    Cancer Father     Heart Disease Brother     Breast Cancer Maternal Grandmother              PHYSICAL EXAMINATION:   Visit Vitals    /73 (BP 1 Location: Left arm, BP Patient Position: Sitting)    Pulse 79    Ht 5' 5\" (1.651 m)    Wt 187 lb (84.8 kg)    SpO2 96%    BMI 31.12 kg/m2     Constitutional: WDWN, Pleasant and appropriate affect, No acute distress. CV:  No peripheral swelling noted  Respiratory: No respiratory distress or difficulties  Abdomen:  No abdominal masses or tenderness. No CVA tenderness. No inguinal hernias noted.     Male:    BRENT:Perineum normal to visual inspection, no erythema or irritation, he has normal anal sphincter tone with no rectal lesions or blood, the prostate is about 30 g in size but with no nodules or induration, generally benign in consistency. SCROTUM:  No scrotal rash or lesions noticed. Normal bilateral testes and epididymis. PENIS: Urethral meatus normal in location and size. No urethral discharge. Skin: No jaundice. Neuro/Psych:  Alert and oriented x 3, affect appropriate. Lymphatic:   No enlarged inguinal lymph nodes. Results for orders placed or performed in visit on 02/05/18   AMB POC URINALYSIS DIP STICK AUTO W/O MICRO   Result Value Ref Range    Color (UA POC) Yellow     Clarity (UA POC) Clear     Glucose (UA POC) Negative Negative    Bilirubin (UA POC) Negative Negative    Ketones (UA POC) Negative Negative    Specific gravity (UA POC) 1.015 1.001 - 1.035    Blood (UA POC) Negative Negative    pH (UA POC) 7.5 4.6 - 8.0    Protein (UA POC) Negative Negative    Urobilinogen (UA POC) 0.2 mg/dL 0.2 - 1    Nitrites (UA POC) Negative Negative    Leukocyte esterase (UA POC) Negative Negative         REVIEW OF LABS AND IMAGING:     Imaging Report Reviewed? NO     Images Reviewed? NO           Other Lab Data Reviewed? YES         ASSESSMENT:     ICD-10-CM ICD-9-CM    1. Benign prostatic hyperplasia without lower urinary tract symptoms N40.0 600.00 AMB POC URINALYSIS DIP STICK AUTO W/O MICRO            PLAN / DISCUSSION: : He has a normal prostate exam with a small gland and minimal outlet obstructive symptoms. His PSA is well within normal limits and is within keeping of his age. At this point in time I suggest he have a BRENT about once a year and an annual PSA to go with that. I told him that his primary physician can certainly get the PSA and that I would check his prostate on an annual visit. The patient expresses understanding and agreement of the discussion and plan. Chen oCte MD on 2/5/2018         Please note: This document has been produced using voice recognition software. Unrecognized errors in transcription may be present.

## 2018-02-05 NOTE — PROGRESS NOTES
Mr. Jennifer Kerns has a reminder for a \"due or due soon\" health maintenance. I have asked that he contact his primary care provider for follow-up on this health maintenance.

## 2018-02-05 NOTE — MR AVS SNAPSHOT
[facility] [address] [address] 
[phone] Patient: Soraya Cox MRN: [id number] :1960 Visit Information Date & Time Provider Department Dept. Phone Encounter #  
 2018  1:30 PM Letcher Severe, [address] [phone] [id number] Your Appointments 2019  1:00 PM  
Office Visit with Letcher Severe, MD  
[facility] [address] [place] [address]) Appt Note: PSA  
 [address] [address] [address]  
[phone] [address] [place] [address] Upcoming Health Maintenance Date Due Hepatitis C Screening 1960 DTaP/Tdap/Td series (1 - Tdap) 1982 FOBT Q 1 YEAR AGE 50-75 2011 Influenza Age 5 to Adult 2017 Allergies as of 2018  Review Complete On: 2018 By: Perri Sue LPN Severity Noted Reaction Type Reactions Pcn [Penicillins]  2018    Hives Current Immunizations  Never Reviewed No immunizations on file. Not reviewed this visit You Were Diagnosed With   
  
 Codes Comments Benign prostatic hyperplasia without lower urinary tract symptoms    -  Primary ICD-10-CM: N40.0 ICD-9-CM: 600.00 Vitals BP Pulse Height(growth percentile) Weight(growth percentile) SpO2 BMI  
 136/73 (BP 1 Location: Left arm, BP Patient Position: Sitting) 79 5' 5\" (1.651 m) 187 lb (84.8 kg) 96% 31.12 kg/m2 Smoking Status Former Smoker Vitals History BMI and BSA Data Body Mass Index Body Surface Area  
 31.12 kg/m 2 1.97 m 2 Preferred Pharmacy Pharmacy Name Phone [address] [facility] [place], [place] [address] [phone] Your Updated Medication List  
  
Notice  As of 2018  3:00 PM  
 You have not been prescribed any medications. We Performed the Following AMB POC URINALYSIS DIP STICK AUTO W/O MICRO [69238 CPT(R)] To-Do List   
 02/06/2018 5:00 PM  
  Appointment with Juanito Leon PTA at [facility]  [facility] [address] ([phone])  
  
 02/09/2018 4:00 PM  
  Appointment with Norma Tony PTA at [address] ([phone])  
  
 02/13/2018 4:00 PM  
  Appointment with Juanito Leon PTA at [address] ([phone])  
  
 02/16/2018 3:30 PM  
  Appointment with Solange Fitzgerald PT at [facility]  [facility] [address] ([phone]) Patient Instructions Urine Test: About This Test 
What is it? A urine test checks the color, clarity (clear or cloudy), odor, concentration, and acidity (pH) of your urine. It also checks your levels of protein, sugar, blood cells, or other substances in your urine. This test is sometimes called a urinalysis. Why is this test done? A urine test may be done: · To check for a disease or infection of the urinary tract. The urinary tract includes the kidneys, the tubes that carry urine from the kidneys to the bladder (ureters), and the bladder. It also includes the tube that carries urine from the bladder to outside the body (urethra). · To check the treatment of conditions such as diabetes, kidney stones, a urinary tract infection (UTI), high blood pressure, or some kidney or liver diseases. How can you prepare for the test? 
· Before the test, don't eat foods that can change the color of your urine. Examples of these include blackberries, beets, and rhubarb. · Don't do heavy exercise before the test. 
· Tell your doctor if you are menstruating or close to starting your period. Your doctor may want to wait to do the test. 
· Tell your doctor about all the nonprescription and prescription medicines and herbs or other supplements you take.  Some of these can affect the results of this test. 
What happens during the test? 
 A urine test can be done in your doctor's office, clinic, or lab. Or you may be asked to collect a urine sample at home. Then you can take it to the office or lab for testing. Clean-catch midstream urine collection · Wash your hands before you start. · If the cup you are given has a lid, remove it carefully. Set it down with the inner surface up. Don't touch the inside of the cup with your fingers. · Clean the area around your genitals. ¨ For men: Pull back the foreskin, if present. Clean the head of your penis with medicated towelettes or swabs. ¨ For women: Spread open the genital folds of skin with one hand. Then use medicated towelettes or swabs in your other hand to clean the area where urine comes out (the urethra). Wipe the area from front to back. · Start urinating into the toilet or urinal. A woman should hold apart the genital folds of skin while she urinates. · After the urine has flowed for several seconds, place the cup into the urine stream. Collect about 2 ounces of urine without stopping your flow of urine. · Don't touch the rim of the cup to your genital area. Don't get toilet paper, pubic hair, stool (feces), menstrual blood, or anything else in the urine sample. · Finish urinating into the toilet or urinal. 
· Carefully replace and tighten the lid on the cup, and then return it to the lab. If you are collecting the urine at home and can't get it to the lab in an hour, refrigerate it. Double-voided urine sample collection This method collects the urine your body is making right now. · Urinate into the toilet or urinal. Don't collect any of this urine. · Drink a large glass of water, and wait about 30 to 40 minutes. · Then get a urine sample. Follow the instructions above for collecting a clean-catch urine sample. · Take the urine sample to the lab. If you are collecting the urine at home and can't get it to the lab in an hour, refrigerate it. Follow-up care is a key part of your treatment and safety. Be sure to make and go to all appointments, and call your doctor if you are having problems. It's also a good idea to keep a list of the medicines you take. Ask your doctor when you can expect to have your test results. Where can you learn more? Go to http://kenna-marline.info/. Enter R266 in the search box to learn more about \"Urine Test: About This Test.\" Current as of: October 14, 2016 Content Version: 11.4 © 4433-7507 [employer]. Care instructions adapted under license by [employer] (which disclaims liability or warranty for this information). If you have questions about a medical condition or this instruction, always ask your healthcare professional. [address] any warranty or liability for your use of this information. Introducing [facility]! Nathan Form introduces Trapster patient portal. Now you can access parts of your medical record, email your doctor's office, and request medication refills online. 1. In your internet browser, go to [email or web address] 2. Click on the First Time User? Click Here link in the Sign In box. You will see the New Member Sign Up page. 3. Enter your Trapster Access Code exactly as it appears below. You will not need to use this code after youve completed the sign-up process. If you do not sign up before the expiration date, you must request a new code. · Trapster Access Code: [email or web address] Expires: 3/18/2018 10:30 AM 
 
4. Enter the last four digits of your Social Security Number (xxxx) and Date of Birth (mm/dd/yyyy) as indicated and click Submit. You will be taken to the next sign-up page. 5. Create a Trapster ID. This will be your Trapster login ID and cannot be changed, so think of one that is secure and easy to remember. 6. Create a Tongtecht password. You can change your password at any time. 7. Enter your Password Reset Question and Answer. This can be used at a later time if you forget your password. 8. Enter your e-mail address. You will receive e-mail notification when new information is available in [address] 9. Click Sign Up. You can now view and download portions of your medical record. 10. Click the Download Summary menu link to download a portable copy of your medical information. If you have questions, please visit the Frequently Asked Questions section of the [employer] website. Remember, [employer] Code is NOT to be used for urgent needs. For medical emergencies, dial 911. Now available from your iPhone and Android! Please provide this summary of care documentation to your next provider. Your primary care clinician is listed as Joey Nash. If you have any questions after today's visit, please call [phone].

## 2018-02-06 ENCOUNTER — HOSPITAL ENCOUNTER (OUTPATIENT)
Dept: PHYSICAL THERAPY | Age: 58
Discharge: HOME | End: 2018-02-06
Payer: COMMERCIAL

## 2018-02-06 PROCEDURE — 97140 MANUAL THERAPY 1/> REGIONS: CPT

## 2018-02-06 PROCEDURE — 97110 THERAPEUTIC EXERCISES: CPT

## 2018-02-06 NOTE — PROGRESS NOTES
PT DAILY TREATMENT NOTE     Patient Name: Evelina Cisse  Date:2018  : 1960  [x]  Patient  Verified   Payor: Alesia Castrejon / [place]  [facility] Product Type: Commerical /    In time:4:19  Out time:4:58  Total Treatment Time (min): 39  Visit #: 6 of     Treatment Area: Left shoulder pain [M25.512]    SUBJECTIVE  Pain Level (0-10 scale): 0/10  Any medication changes, allergies to medications, adverse drug reactions, diagnosis change, or new procedure performed?: [x] No    [] Yes (see summary sheet for update)  Subjective functional status/changes:   [] No changes reported  \"It's getting better, no pain right now. \"    OBJECTIVE    31 min Therapeutic Exercise:  [x] See flow sheet :   Rationale: increase ROM, increase strength and increase proprioception to improve the patients ability to perform ADL's.    8 min Manual Therapy:  (L) ST/GH joint mobs, PROM, DTM/TPR (L) UT. Rationale: decrease pain, increase ROM, increase tissue extensibility and decrease trigger points to improve activity tolerance. With   [x] TE   [] TA   [] neuro   [] other: Patient Education: [x] Review HEP    [] Progressed/Changed HEP based on:   [] positioning   [] body mechanics   [] transfers   [] heat/ice application    [] other:      Other Objective/Functional Measures: Increased wand PRE's to 15 reps each. Pain Level (0-10 scale) post treatment: 1/10    ASSESSMENT/Changes in Function: FOTO 47%. PROM has improved however capsular restrictions still noted. Patient will continue to benefit from skilled PT services to modify and progress therapeutic interventions, address functional mobility deficits, address ROM deficits, address strength deficits, analyze and address soft tissue restrictions and analyze and cue movement patterns to attain remaining goals.      [x]  See Plan of Care  []  See progress note/recertification  []  See Discharge Summary         Progress towards goals / Updated goals:  Short Term Goals: To be accomplished in 1 weeks:                         1. Pt will be I and compliant with HEP - Pt reports HEP compliance. 1/23/2018  Long Term Goals: To be accomplished in 4 weeks:                         7. Improve FOTO to predicted outcome for improved ability for functional tasks - FOTO 47%. 2/6/2018                         2. Improve L shoulder PROM flexion to 130 degrees for improve ability for future tasks - Progressing, 125 degrees. 1/30/2018                         3. Improve L shoulder PROM ER to 45 degrees for improved ability for use of L UE                         4. Improve L shoulder PROM IR to 60 degrees for improved ability for ADL.      PLAN  []  Upgrade activities as tolerated     [x]  Continue plan of care  []  Update interventions per flow sheet       []  Discharge due to:_  []  Other:_      Ariana Roahc PTA 2/6/2018  4:21 PM    Future Appointments  Date Time Provider Syeda Manzanares   2/6/2018 5:00 PM Ariana Roach PTA [facility]   2/9/2018 4:00 PM Valentino Zuniga PTA [facility]   2/13/2018 4:00 PM Ariana Roach PTA [facility]   2/16/2018 3:30 PM Nancy Hernandez, PT [facility]   2/4/2019 1:00 PM Letcher Severe, MD [address] [place] [address]

## 2018-02-09 ENCOUNTER — HOSPITAL ENCOUNTER (OUTPATIENT)
Dept: PHYSICAL THERAPY | Age: 58
Discharge: HOME | End: 2018-02-09
Payer: COMMERCIAL

## 2018-02-09 PROCEDURE — 97110 THERAPEUTIC EXERCISES: CPT

## 2018-02-09 PROCEDURE — 97112 NEUROMUSCULAR REEDUCATION: CPT

## 2018-02-09 NOTE — PROGRESS NOTES
PT DAILY TREATMENT NOTE     Patient Name: Bety Figueredo  Date:2018  : 1960  [x]  Patient  Verified  Payor: Josep Castaneda / [place] [employer]  [facility] PT / Product Type: Commerical /    In time:4:00  Out time:4:31  Total Treatment Time (min): 31  Visit #: 7 of     Treatment Area: Left shoulder pain [M25.512]    SUBJECTIVE  Pain Level (0-10 scale): 0/10  Any medication changes, allergies to medications, adverse drug reactions, diagnosis change, or new procedure performed?: [x] No    [] Yes (see summary sheet for update)  Subjective functional status/changes:   [] No changes reported  Pt reports compliance with HEP. Pt reports his shoulder is coming along. OBJECTIVE    23 min Therapeutic Exercise:  [x] See flow sheet :   Rationale: increase ROM and increase strength to improve the patients ability to tolerate ADLs. 8 min Manual Therapy:  PROM L shoulder   Rationale: decrease pain, increase ROM and increase tissue extensibility to improve tolerance to ADLs. With   [] TE   [] TA   [] neuro   [] other: Patient Education: [x] Review HEP    [] Progressed/Changed HEP based on:   [] positioning   [] body mechanics   [] transfers   [] heat/ice application    [] other:      Other Objective/Functional Measures: increased shoulder PROM in all planes. Pain Level (0-10 scale) post treatment: 0/10    ASSESSMENT/Changes in Function: Though patient continues to demonstrate improved shoulder mobility but continued to have firm end feel at end range. Patient will continue to benefit from skilled PT services to modify and progress therapeutic interventions, address functional mobility deficits, address ROM deficits, address strength deficits, analyze and address soft tissue restrictions, analyze and cue movement patterns, analyze and modify body mechanics/ergonomics and assess and modify postural abnormalities to attain remaining goals.      []  See Plan of Care  []  See progress note/recertification  []  See Discharge Summary         Progress towards goals / Updated goals:  Short Term Goals: To be accomplished in 1 weeks:                         1. Pt will be I and compliant with HEP - Pt reports HEP compliance. 1/23/2018  Long Term Goals: To be accomplished in 4 weeks:                         6. Improve FOTO to predicted outcome for improved ability for functional tasks - FOTO 47%. 2/6/2018                         2. Improve L shoulder PROM flexion to 130 degrees for improve ability for future tasks - Progressing, 125 degrees. 1/30/2018                         3. Improve L shoulder PROM ER to 45 degrees for improved ability for use of L UE                         4. Improve L shoulder PROM IR to 60 degrees for improved ability for ADL.      PLAN  []  Upgrade activities as tolerated     [x]  Continue plan of care  []  Update interventions per flow sheet       []  Discharge due to:_  []  Other:_      Jose Carlos Willis PTA 2/9/2018  4:20 PM    Future Appointments  Date Time Provider Syeda Manzanares   2/13/2018 4:00 PM [facility]   2/16/2018 3:30 PM Michelle Ayon PT [facility]   2/4/2019 1:00 PM Marquis Ambrosio MD [facility]

## 2018-02-13 ENCOUNTER — HOSPITAL ENCOUNTER (OUTPATIENT)
Dept: PHYSICAL THERAPY | Age: 58
Discharge: HOME | End: 2018-02-13
Payer: COMMERCIAL

## 2018-02-13 PROCEDURE — 97140 MANUAL THERAPY 1/> REGIONS: CPT

## 2018-02-13 PROCEDURE — 97110 THERAPEUTIC EXERCISES: CPT

## 2018-02-13 NOTE — PROGRESS NOTES
PT DAILY TREATMENT NOTE     Patient Name: Darius Dickerson  Date:2018  : 1960  [x]  Patient  Verified  Payor: Rajeev Martinez / [place] [place] [facility]  [facility] Product Type: Commerical /    In time:4:00  Out time:4:51  Total Treatment Time (min): 51  Visit #: 8 of     Treatment Area: Left shoulder pain [M25.512]    SUBJECTIVE  Pain Level (0-10 scale): 0/10  Any medication changes, allergies to medications, adverse drug reactions, diagnosis change, or new procedure performed?: [x] No    [] Yes (see summary sheet for update)  Subjective functional status/changes:   [] No changes reported  \"It's moving a lot better but it feels weak. \"    OBJECTIVE    43 min Therapeutic Exercise:  [x] See flow sheet :   Rationale: increase ROM, increase strength and increase proprioception to improve the patients ability to perform ADL's.    8 min Manual Therapy:  (L) ST/GH joint mobs, PROM, DTM/TPR (L) UT, lev scap. Rationale: decrease pain, increase ROM, increase tissue extensibility and decrease trigger points to perform OH activities. With   [x] TE   [] TA   [] neuro   [] other: Patient Education: [x] Review HEP    [] Progressed/Changed HEP based on:   [] positioning   [] body mechanics   [] transfers   [] heat/ice application    [] other:      Other Objective/Functional Measures: Increased supine wand to standing 10 reps each. Added full cans 3-way and side-lying PRE's 10x each. Pain Level (0-10 scale) post treatment: 0/10    ASSESSMENT/Changes in Function: Scapular elevation with AROM, flexion > scaption. Patient will continue to benefit from skilled PT services to modify and progress therapeutic interventions, address functional mobility deficits, address ROM deficits, address strength deficits, analyze and address soft tissue restrictions and analyze and modify body mechanics/ergonomics to attain remaining goals.      [x]  See Plan of Care  []  See progress note/recertification  []  See Discharge Summary         Progress towards goals / Updated goals:  Short Term Goals: To be accomplished in 1 weeks:                         1. Pt will be I and compliant with HEP - Pt reports HEP compliance. 1/23/2018  Long Term Goals: To be accomplished in 4 weeks:                         6. Improve FOTO to predicted outcome for improved ability for functional tasks - FOTO 47%. 2/6/2018                         2. Improve L shoulder PROM flexion to 130 degrees for improve ability for future tasks - Progressing, 125 degrees. 1/30/2018                         3. Improve L shoulder PROM ER to 45 degrees for improved ability for use of L UE.                           4. Improve L shoulder PROM IR to 60 degrees for improved ability for ADL.      PLAN  []  Upgrade activities as tolerated     [x]  Continue plan of care  []  Update interventions per flow sheet       []  Discharge due to:_  []  Other:_      Joe Brenner PTA 2/13/2018  3:58 PM    Future Appointments  Date Time Provider Syeda Manzanares   2/13/2018 4:00 PM [facility]   2/16/2018 3:30 PM Betsy Sexton PT [facility]   2/4/2019 1:00 PM Delroy Baker MD [address] [place] [address]

## 2018-02-16 ENCOUNTER — HOSPITAL ENCOUNTER (OUTPATIENT)
Dept: PHYSICAL THERAPY | Age: 58
Discharge: HOME | End: 2018-02-16
Payer: COMMERCIAL

## 2018-02-16 PROCEDURE — 97140 MANUAL THERAPY 1/> REGIONS: CPT

## 2018-02-16 PROCEDURE — 97110 THERAPEUTIC EXERCISES: CPT

## 2018-02-16 NOTE — PROGRESS NOTES
PT DAILY TREATMENT NOTE     Patient Name: Ivon Espinosa  Date:2018  : 1960  [x]  Patient  Verified  Payor: Janeth Mane / Plan: [place] [employer]  [facility] PT / Product Type: Commerical /    In time:3:30  Out time:4:10  Total Treatment Time (min): 40  Visit #: 9 of     Treatment Area: Left shoulder pain [M25.512]    SUBJECTIVE  Pain Level (0-10 scale): 0/10  Any medication changes, allergies to medications, adverse drug reactions, diagnosis change, or new procedure performed?: [x] No    [] Yes (see summary sheet for update)  Subjective functional status/changes:   [] No changes reported  Pt reports a lot of improvement with PT    OBJECTIVE    32 min Therapeutic Exercise:  [x] See flow sheet :   Rationale: increase ROM, increase strength and increase proprioception to improve the patients ability to perform ADL's.    8 min Manual Therapy:  (L) ST/GH joint mobs, PROM   Rationale: decrease pain, increase ROM, increase tissue extensibility and decrease trigger points to perform OH activities. With   [x] TE   [] TA   [] neuro   [] other: Patient Education: [x] Review HEP    [] Progressed/Changed HEP based on:   [] positioning   [] body mechanics   [] transfers   [] heat/ice application    [] other:      Other Objective/Functional Measures:PROM: flexion 140 degrees, ER 47 degrees, IR 55 degrees    Pain Level (0-10 scale) post treatment: 0/10    ASSESSMENT/Changes in Function:  Pt progressing well with PROM    Patient will continue to benefit from skilled PT services to modify and progress therapeutic interventions, address functional mobility deficits, address ROM deficits, address strength deficits, analyze and address soft tissue restrictions and analyze and modify body mechanics/ergonomics to attain remaining goals.      []  See Plan of Care  [x]  See progress note/recertification  []  See Discharge Summary         Progress towards goals / Updated goals:  Short Term Goals: To be accomplished in 1 weeks:                         7. Pt will be I and compliant with HEP - Pt reports HEP compliance. 1/23/2018  Long Term Goals: To be accomplished in 4 weeks:                         5. Improve FOTO to predicted outcome for improved ability for functional tasks - FOTO 47%. 2/6/2018                         2. Improve L shoulder PROM flexion to 130 degrees for improve ability for future tasks - degrees 2-16-18                         3. Improve L shoulder PROM ER to 45 degrees for improved ability for use of L UE. - MET 47 degrees 2-16-18                          4. Improve L shoulder PROM IR to 60 degrees for improved ability for ADL. - 55 degrees 2-16-18     PLAN  []  Upgrade activities as tolerated     [x]  Continue plan of care  []  Update interventions per flow sheet       []  Discharge due to:_  []  Other:_      Radha Lim, PT 2/16/2018  3:54 PM    Future Appointments  Date Time Provider Syeda Manzanares   2/4/2019 1:00 PM Iftikhar Perez MD [address] [place] [address]

## 2018-02-19 NOTE — PROGRESS NOTES
[facility] [address]  [place], [address]  [phone] fax    Physical Therapy Progress Note  Patient name: Mihai Doherty Start of Care: 2018   Referral source: Millicent Crook,* : 1960                          Medical Diagnosis: Left shoulder pain [M25.512] Onset Date:2017                          Treatment Diagnosis: L shoulder adhesive capsulitis and RTC tear   Prior Hospitalization: see medical history Provider#: [id number]   Medications: Verified on Patient summary List    Comorbidities: + smoker   Prior Level of Function: functionally I with daily activities    Visits from Start of Care: 9    Missed Visits: -      Established Goals:        Excellent         Good         Limited            None  [x] Increased ROM   []  [x]  []  []  [] Increased Strength  []  []  [x]  []  [x] Increased Mobility  []  [x]  []  []   [x] Decreased Pain   [x]  [x]  []  []  [] Decreased Swelling  []  []  []  []    Key Functional Changes:    Progress towards goals:  Short Term Goals: To be accomplished in 1 weeks:                         9. Pt will be I and compliant with HEP - Pt reports HEP compliance. 2018  Long Term Goals: To be accomplished in 4 weeks:                         5. Improve FOTO to predicted outcome for improved ability for functional tasks - FOTO 47%. 2018                         2. Improve L shoulder PROM flexion to 130 degrees for improve ability for future tasks - degrees 18                         3. Improve L shoulder PROM ER to 45 degrees for improved ability for use of L UE. - MET 47 degrees 18                          4. Improve L shoulder PROM IR to 60 degrees for improved ability for ADL. - 55 degrees 18     Updated Goals: to be achieved in 2-4 weeks:   1. Improve FOTO to predicted outcome for improved ability for functional tasks   2.   Improve L shoulder PROM IR to 60 degrees for improved ability for ADL. - 55 degrees 2-16-18    3. Pt will report no difficulty reaching a shelf shoulder height. 4. Pt will no difficulty putting on deodorant on the opposite arm. ASSESSMENT/RECOMMENDATIONS:  [x]Continue therapy per initial plan/protocol at a frequency of  2-3 x per week for 2-4 weeks  []Continue therapy with the following recommended changes:_____________________      _____________________________________________________________________  []Discontinue therapy progressing towards or have reached established goals  []Discontinue therapy due to lack of appreciable progress towards goals  []Discontinue therapy due to lack of attendance or compliance  []Await Physician's recommendations/decisions regarding therapy  []Other:________________________________________________________________    Thank you for this referral.    Lorri Schroeder, PT 2/19/2018 9:29 AM  NOTE TO PHYSICIAN:  PLEASE COMPLETE THE ORDERS BELOW AND   FAX TO [facility]: [phone]  If you are unable to process this request in 24 hours please contact our office: [phone] I have read the above report and request that my patient continue as recommended. ? I have read the above report and request that my patient continue therapy with the following changes/special instructions:__________________________________________________________  ? I have read the above report and request that my patient be discharged from therapy.     Physicians signature: ______________________________Date: ______Time:______

## 2018-02-21 ENCOUNTER — HOSPITAL ENCOUNTER (OUTPATIENT)
Dept: PHYSICAL THERAPY | Age: 58
Discharge: HOME | End: 2018-02-21
Payer: COMMERCIAL

## 2018-02-21 PROCEDURE — 97110 THERAPEUTIC EXERCISES: CPT

## 2018-02-21 PROCEDURE — 97140 MANUAL THERAPY 1/> REGIONS: CPT

## 2018-02-21 NOTE — PROGRESS NOTES
PT DAILY TREATMENT NOTE 3-16    Patient Name: Redd Mclaughlin  Date:2018  : 1960  [x]  Patient  Verified  Payor: Daisy Michelle / Plan: [place] [employer]  [facility] / Product Type: Commerical /    In time:4:30  Out time:5:10  Total Treatment Time (min): 40  Visit #: 1 of 4-12    Treatment Area: Left shoulder pain [M25.512]    SUBJECTIVE  Pain Level (0-10 scale): 0  Any medication changes, allergies to medications, adverse drug reactions, diagnosis change, or new procedure performed?: [x] No    [] Yes (see summary sheet for update)  Subjective functional status/changes:   [] No changes reported  \"I'm doing a lot better. It's not popping like it was before. \"    OBJECTIVE  32 min Therapeutic Exercise:  [x] See flow sheet :   Rationale: increase ROM, increase strength and improve coordination to improve the patients ability to increase ease with ADLs    8 min Manual Therapy:  Left STJ mobs, PROM left shoulder   Rationale: decrease pain, increase ROM and increase tissue extensibility to ease ADL tolerance           With   [] TE   [] TA   [] neuro   [] other: Patient Education: [x] Review HEP    [] Progressed/Changed HEP based on:   [] positioning   [] body mechanics   [] transfers   [] heat/ice application    [] other:      Other Objective/Functional Measures:   Presents with UT hike with ROM greater than 90 degrees     Pain Level (0-10 scale) post treatment: 0    ASSESSMENT/Changes in Function:   Patient's ROM and functional mobility is improving. Patient will continue to benefit from skilled PT services to modify and progress therapeutic interventions, address functional mobility deficits, address ROM deficits, address strength deficits, analyze and address soft tissue restrictions, analyze and cue movement patterns, analyze and modify body mechanics/ergonomics and assess and modify postural abnormalities to attain remaining goals.      []  See Plan of Care  []  See progress note/recertification  []  See Discharge Summary         Updated Goals: to be achieved in 2-4 weeks:                        1. Improve FOTO to predicted outcome for improved ability for functional tasks                        2.  Improve L shoulder PROM IR to 60 degrees for improved ability for ADL. - 55 degrees 2-16-18                         3. Pt will report no difficulty reaching a shelf shoulder height. 4. Pt will no difficulty putting on deodorant on the opposite arm.      PLAN  [x]  Upgrade activities as tolerated     [x]  Continue plan of care  []  Update interventions per flow sheet       []  Discharge due to:_  []  Other:_      Marlon Kocher 2/21/2018  4:36 PM    Future Appointments  Date Time Provider Syeda Manzanares   2/23/2018 2:30 PM [facility]   2/28/2018 3:30 PM Marlon Kocher [facility]   2/4/2019 1:00 PM Mesha Eddy MD [address] [place] [address]

## 2018-02-23 ENCOUNTER — HOSPITAL ENCOUNTER (OUTPATIENT)
Dept: PHYSICAL THERAPY | Age: 58
End: 2018-02-23
Payer: COMMERCIAL

## 2018-02-26 ENCOUNTER — HOSPITAL ENCOUNTER (OUTPATIENT)
Dept: PHYSICAL THERAPY | Age: 58
Discharge: HOME | End: 2018-02-26
Payer: COMMERCIAL

## 2018-02-26 PROCEDURE — 97110 THERAPEUTIC EXERCISES: CPT

## 2018-02-26 PROCEDURE — 97140 MANUAL THERAPY 1/> REGIONS: CPT

## 2018-02-26 NOTE — PROGRESS NOTES
PT DAILY TREATMENT NOTE 3-16    Patient Name: Ivon Espinosa  Date:2018  : 1960  [x]  Patient  Verified  Payor: Janeth Mane / Plan: [place] [employer]  [facility] Type: Com[facility] /    In time:2:00  Out time:2:38  Total Treatment Time (min): 38  Visit #: 2 of 4-12    Treatment Area: Left shoulder pain [M25.512]    SUBJECTIVE  Pain Level (0-10 scale): 0  Any medication changes, allergies to medications, adverse drug reactions, diagnosis change, or new procedure performed?: [x] No    [] Yes (see summary sheet for update)  Subjective functional status/changes:   [] No changes reported  Patient reports no new complaints. OBJECTIVE  30 min Therapeutic Exercise:  [x] See flow sheet :   Rationale: increase ROM, increase strength and improve coordination to improve the patients ability to increase ease with ADLs    8 min Manual Therapy:  Left STJ mobs, STM to parascapular musculature, grade II inferior GHJ mobs   Rationale: decrease pain, increase ROM and increase tissue extensibility to ease ADL tolerance           With   [] TE   [] TA   [] neuro   [] other: Patient Education: [x] Review HEP    [] Progressed/Changed HEP based on:   [] positioning   [] body mechanics   [] transfers   [] heat/ice application    [] other:      Other Objective/Functional Measures: Moderate UT hike against gravity     Pain Level (0-10 scale) post treatment: 2    ASSESSMENT/Changes in Function:   Continues to progress well towards updated goals. Demonstrates improved functional mobility. Patient will continue to benefit from skilled PT services to modify and progress therapeutic interventions, address functional mobility deficits, address ROM deficits, address strength deficits, analyze and address soft tissue restrictions, analyze and cue movement patterns, analyze and modify body mechanics/ergonomics and assess and modify postural abnormalities to attain remaining goals.      []  See Plan of Care  []  See progress note/recertification  []  See Discharge Summary         Updated Goals: to be achieved in 2-4 weeks:                        1. Improve FOTO to predicted outcome for improved ability for functional tasks                        2.  Improve L shoulder PROM IR to 60 degrees for improved ability for ADL. - 55 degrees 2-16-18                         3. Pt will report no difficulty reaching a shelf shoulder height.                        4. Pt will no difficulty putting on deodorant on the opposite arm. -met, patient with no difficulty (2/26/2018)    PLAN  [x]  Upgrade activities as tolerated     [x]  Continue plan of care  []  Update interventions per flow sheet       []  Discharge due to:_  []  Other:_      Bashir Quiver 2/26/2018  2:05 PM    Future Appointments  Date Time Provider Syeda Manzanares   2/28/2018 3:30 PM Rubens [address] [facility]   2/4/2019 1:00 PM Evelyne Pappas MD [address] [place] [address]

## 2018-02-28 ENCOUNTER — HOSPITAL ENCOUNTER (OUTPATIENT)
Dept: PHYSICAL THERAPY | Age: 58
Discharge: HOME | End: 2018-02-28
Payer: COMMERCIAL

## 2018-02-28 PROCEDURE — 97110 THERAPEUTIC EXERCISES: CPT

## 2018-02-28 PROCEDURE — 97140 MANUAL THERAPY 1/> REGIONS: CPT

## 2018-02-28 NOTE — PROGRESS NOTES
PT DAILY TREATMENT NOTE 3-16    Patient Name: Surya Young  Date:2018  : 1960  [x]  Patient  Verified  Payor: Amada Ours / Plan: [place] [employer]  [facility] Product Type: Com[facility]ical /    In time:3:30  Out time: 4:10  Total Treatment Time (min): 40  Visit #: 3 of 4-12    Treatment Area: Left shoulder pain [M25.512]    SUBJECTIVE  Pain Level (0-10 scale): 0  Any medication changes, allergies to medications, adverse drug reactions, diagnosis change, or new procedure performed?: [x] No    [] Yes (see summary sheet for update)  Subjective functional status/changes:   [] No changes reported  \"I don't think I need surgery for my shoulder anymore. \"    OBJECTIVE  32 min Therapeutic Exercise:  [x] See flow sheet :   Rationale: increase ROM, increase strength and improve coordination to improve the patients ability to increase ease with ADLs    8 min Manual Therapy:  Left STJ mobs, left PROM shoulder all planes   Rationale: decrease pain, increase ROM and increase tissue extensibility to ease ADL tolerance           With   [] TE   [] TA   [] neuro   [] other: Patient Education: [x] Review HEP    [] Progressed/Changed HEP based on:   [] positioning   [] body mechanics   [] transfers   [] heat/ice application    [] other:      Other Objective/Functional Measures:   Minimal cueing for correct form     Pain Level (0-10 scale) post treatment: 0    ASSESSMENT/Changes in Function:   Continual cueing to decrease UT hike. Added cone reach at various shelf heights; patient has no difficulty. Patient will continue to benefit from skilled PT services to modify and progress therapeutic interventions, address functional mobility deficits, address ROM deficits, address strength deficits, analyze and address soft tissue restrictions, analyze and cue movement patterns, analyze and modify body mechanics/ergonomics and assess and modify postural abnormalities to attain remaining goals.      []  See Plan of Care  []  See progress note/recertification  []  See Discharge Summary         Updated Goals: to be achieved in 2-4 weeks:                        1. Improve FOTO to predicted outcome for improved ability for functional tasks                        2.  Improve L shoulder PROM IR to 60 degrees for improved ability for ADL. - 55 degrees 2-16-18                         3. Pt will report no difficulty reaching a shelf shoulder height.                        4. Pt will no difficulty putting on deodorant on the opposite arm. -met, patient with no difficulty (2/26/2018)    PLAN  []  Upgrade activities as tolerated     [x]  Continue plan of care  []  Update interventions per flow sheet       []  Discharge due to:_  []  Other:_      Sandor Osgood 2/28/2018  3:21 PM    Future Appointments  Date Time Provider Syeda Manzanares   2/28/2018 3:30 PM [address]ens [address] [facility]   3/5/2018 2:30 PM Isa Howe [facility]   3/7/2018 2:30 PM [address] [facility]   3/13/2018 2:30 PM Binta Durán PTA [facility]   3/16/2018 4:00 PM Sandor Osgood [facility]   3/20/2018 3:30 PM Isadale Moralesio [facility]   2/4/2019 1:00 PM Sanchez Sierra MD [address] [place] [address]

## 2018-03-05 ENCOUNTER — HOSPITAL ENCOUNTER (OUTPATIENT)
Dept: PHYSICAL THERAPY | Age: 58
Discharge: HOME | End: 2018-03-05
Payer: COMMERCIAL

## 2018-03-05 PROCEDURE — 97110 THERAPEUTIC EXERCISES: CPT

## 2018-03-05 PROCEDURE — 97140 MANUAL THERAPY 1/> REGIONS: CPT

## 2018-03-05 NOTE — PROGRESS NOTES
PT DAILY TREATMENT NOTE 3-16    Patient Name: Bety Figueredo  Date:3/5/2018  : 1960  [x]  Patient  Verified  Payor: Josep Castaneda / Plan: [facility] [employer]  [facility] Product Type: [facility] /    In time:2:36  Out time:3:18  Total Treatment Time (min): 42  Visit #: 4 of 4-12    Treatment Area: Left shoulder pain [M25.512]    SUBJECTIVE  Pain Level (0-10 scale): 0  Any medication changes, allergies to medications, adverse drug reactions, diagnosis change, or new procedure performed?: [x] No    [] Yes (see summary sheet for update)  Subjective functional status/changes:   [] No changes reported  \"It's getting better. It pops every now and then, but, otherwise, it's doing good. \"    OBJECTIVE  34 min Therapeutic Exercise:  [x] See flow sheet :   Rationale: increase ROM, increase strength and improve coordination to improve the patients ability to increase ease with ADLs    8 min Manual Therapy:  PROM left shoulder, left STJ mobs, Grade II inferior GHJ mobs in abduction   Rationale: decrease pain, increase ROM and increase tissue extensibility to ease ADL tolerance           With   [] TE   [] TA   [] neuro   [] other: Patient Education: [x] Review HEP    [] Progressed/Changed HEP based on:   [] positioning   [] body mechanics   [] transfers   [] heat/ice application    [] other:      Other Objective/Functional Measures: Added 1# for s/l shoulder series     Pain Level (0-10 scale) post treatment: 0    ASSESSMENT/Changes in Function:   Patient is making excellent progress towards updated goals. Demonstrates improvement in functional mobility and presents with increase in FOTO score also indicating functional improvement. Consider D/C in next few visits with updated HEP.      Patient will continue to benefit from skilled PT services to modify and progress therapeutic interventions, address functional mobility deficits, address ROM deficits, address strength deficits, analyze and address soft tissue restrictions, analyze and cue movement patterns, analyze and modify body mechanics/ergonomics and assess and modify postural abnormalities to attain remaining goals. []  See Plan of Care  []  See progress note/recertification  []  See Discharge Summary         Updated Goals: to be achieved in 2-4 weeks:                        3. Improve FOTO to predicted outcome for improved ability for functional tasks. FOTO score to 77 (3/5/2018)                        2.  Improve L shoulder PROM IR to 60 degrees for improved ability for ADL. - 55 degrees 2-16-18                         3. Pt will report no difficulty reaching a shelf shoulder height.-met, patient able to perform cone reach at various shelf heights with no difficulty (3/5/2018)                        4. Pt will no difficulty putting on deodorant on the opposite arm. -met, patient with no difficulty (2/26/2018)    PLAN  []  Upgrade activities as tolerated     [x]  Continue plan of care  []  Update interventions per flow sheet       []  Discharge due to:_  []  Other:_      Marlon Kocher 3/5/2018  2:53 PM    Future Appointments  Date Time Provider Syeda Manzanares   3/7/2018 2:30 PM [address] [facility]   3/13/2018 2:30 PM [facility]   3/16/2018 4:00 PM [address] [facility]   3/20/2018 3:30 PM [facility]   2/4/2019 1:00 PM Mesha Eddy MD [address] [place] [address]

## 2018-03-07 ENCOUNTER — HOSPITAL ENCOUNTER (OUTPATIENT)
Dept: PHYSICAL THERAPY | Age: 58
End: 2018-03-07
Payer: COMMERCIAL

## 2018-03-09 ENCOUNTER — HOSPITAL ENCOUNTER (OUTPATIENT)
Dept: PHYSICAL THERAPY | Age: 58
Discharge: HOME | End: 2018-03-09
Payer: COMMERCIAL

## 2018-03-09 PROCEDURE — 97140 MANUAL THERAPY 1/> REGIONS: CPT

## 2018-03-09 PROCEDURE — 97110 THERAPEUTIC EXERCISES: CPT

## 2018-03-09 NOTE — PROGRESS NOTES
PT DAILY TREATMENT NOTE     Patient Name: Darius Dickerson  Date:3/9/2018  : 1960  [x]  Patient  Verified  Payor: Rajeev Martinez / Plan: [place] [employer]  [facility] Product Type: Commerical /    In time:2:30  Out time:3:11  Total Treatment Time (min): 41  Visit #: 5 of -12    Treatment Area: Left shoulder pain [M25.512]    SUBJECTIVE  Pain Level (0-10 scale): 0/10  Any medication changes, allergies to medications, adverse drug reactions, diagnosis change, or new procedure performed?: [x] No    [] Yes (see summary sheet for update)  Subjective functional status/changes:   [] No changes reported  Pt reports no new complaints of pain. OBJECTIVE    33 min Therapeutic Exercise:  [] See flow sheet :   Rationale: increase ROM and increase strength to improve the patients ability to tolerate ADLs. 8 min Manual Therapy:  PROM L shoulder all planes   Rationale: decrease pain, increase ROM and increase tissue extensibility to improve tolerance to ADLs. With   [] TE   [] TA   [] neuro   [] other: Patient Education: [x] Review HEP    [] Progressed/Changed HEP based on:   [] positioning   [] body mechanics   [] transfers   [] heat/ice application    [] other:      Other Objective/Functional Measures: Pt demonstrates improved L shoulder mobility. Pain Level (0-10 scale) post treatment: 2/10    ASSESSMENT/Changes in Function: Pt requires frequent vc's to reduce guarding with PROM but does demonstrate increased available L shoulder PROM. Patient will continue to benefit from skilled PT services to modify and progress therapeutic interventions, address functional mobility deficits, address ROM deficits, address strength deficits, analyze and address soft tissue restrictions, analyze and cue movement patterns, analyze and modify body mechanics/ergonomics and assess and modify postural abnormalities to attain remaining goals.      []  See Plan of Care  []  See progress note/recertification  []  See Discharge Summary         Progress towards goals / Updated goals:  Updated Goals: to be achieved in 2-4 weeks:                        1. Improve FOTO to predicted outcome for improved ability for functional tasks. FOTO score to 77 (3/5/2018)                        2.  Improve L shoulder PROM IR to 60 degrees for improved ability for ADL. - 55 degrees 2-16-18                         3. Pt will report no difficulty reaching a shelf shoulder height.-met, patient able to perform cone reach at various shelf heights with no difficulty (3/5/2018)                        4. Pt will no difficulty putting on deodorant on the opposite arm. -met, patient with no difficulty (2/26/2018)    PLAN  []  Upgrade activities as tolerated     [x]  Continue plan of care  []  Update interventions per flow sheet       []  Discharge due to:_  []  Other:_      Berta Cano PTA 3/9/2018  4:34 PM    Future Appointments  Date Time Provider Syeda Manzanares   3/13/2018 2:30 PM Kevin Patel PTA [facility]   3/16/2018 4:00 PM [facility]   3/20/2018 3:30 PM [facility]bane[facility]   2/4/2019 1:00 PM Primo Bey MD [address] [facility]

## 2018-03-13 ENCOUNTER — HOSPITAL ENCOUNTER (OUTPATIENT)
Dept: PHYSICAL THERAPY | Age: 58
Discharge: HOME | End: 2018-03-13
Payer: COMMERCIAL

## 2018-03-13 PROCEDURE — 97140 MANUAL THERAPY 1/> REGIONS: CPT

## 2018-03-13 PROCEDURE — 97110 THERAPEUTIC EXERCISES: CPT

## 2018-03-13 NOTE — PROGRESS NOTES
PT DAILY TREATMENT NOTE     Patient Name: Harris Fields  Date:3/13/2018  : 1960  [x]  Patient  Verified  Payor: Krissy Pickens / Plan: [place] [employer]  [facility] PT / Product Type: Commerical /    In time:2:30  Out time:3:14  Total Treatment Time (min): 44  Visit #: 6 of -12    Treatment Area: Left shoulder pain [M25.512]    SUBJECTIVE  Pain Level (0-10 scale): 0/10  Any medication changes, allergies to medications, adverse drug reactions, diagnosis change, or new procedure performed?: [x] No    [] Yes (see summary sheet for update)  Subjective functional status/changes:   [] No changes reported  \"Doing much better. \"    OBJECTIVE    36 min Therapeutic Exercise:  [x] See flow sheet :   Rationale: increase ROM, increase strength and increase proprioception to improve the patients ability to perform ADL's.    8 min Manual Therapy:  (L) ST/GH joint mobs, PROM, STM/DTM (L) UT, lev scap. Rationale: decrease pain, increase ROM, increase tissue extensibility and decrease trigger points to improve OH activity tolerance. With   [x] TE   [] TA   [] neuro   [] other: Patient Education: [x] Review HEP    [] Progressed/Changed HEP based on:   [] positioning   [] body mechanics   [] transfers   [] heat/ice application    [] other:      Other Objective/Functional Measures: PROM (L) Shoulder ER 60 degrees, IR 65 degrees. Pain Level (0-10 scale) post treatment: 0/10    ASSESSMENT/Changes in Function: Improved shoulder mobility. Pt reports improved ease with ADL's. Continues to be limited with PROM abd. Patient will continue to benefit from skilled PT services to modify and progress therapeutic interventions, address functional mobility deficits, address ROM deficits, address strength deficits, analyze and address soft tissue restrictions and analyze and cue movement patterns to attain remaining goals.      [x]  See Plan of Care  []  See progress note/recertification  []  See Discharge Summary         Progress towards goals / Updated goals:  Updated Goals: to be achieved in 2-4 weeks:                        1. Improve FOTO to predicted outcome for improved ability for functional tasks. FOTO score to 77 (3/5/2018)                        2.  Improve L shoulder PROM IR to 60 degrees for improved ability for ADL. - 55 degrees 2-16-18; Met, 65 degrees.  3/13/2018                        3. Pt will report no difficulty reaching a shelf shoulder height.-met, patient able to perform cone reach at various shelf heights with no difficulty (3/5/2018)                        4. Pt will no difficulty putting on deodorant on the opposite arm. -met, patient with no difficulty (2/26/2018)    PLAN  []  Upgrade activities as tolerated     [x]  Continue plan of care  []  Update interventions per flow sheet       []  Discharge due to:_  []  Other:_      Diania Severs, PTA 3/13/2018  2:25 PM    Future Appointments  Date Time Provider Syeda Manzanares   3/13/2018 2:30 PM Diania Severs, PTA [facility]   3/16/2018 4:00 PM Jesse Allen PTA [facility]   3/20/2018 3:30 PM Nita Ochoa [facility]   2/4/2019 1:00 PM Ronna Pennington MD [address] [place] [address]

## 2018-03-16 ENCOUNTER — HOSPITAL ENCOUNTER (OUTPATIENT)
Dept: PHYSICAL THERAPY | Age: 58
Discharge: HOME | End: 2018-03-16
Payer: COMMERCIAL

## 2018-03-16 PROCEDURE — 97110 THERAPEUTIC EXERCISES: CPT

## 2018-03-16 PROCEDURE — 97140 MANUAL THERAPY 1/> REGIONS: CPT

## 2018-03-16 NOTE — PROGRESS NOTES
PT DAILY TREATMENT NOTE     Patient Name: Nessa Leigh  Date:3/16/2018  : 1960  [x]  Patient  Verified  Payor: Brina Beebe / Plan: [place] [employer]  [facility] / Product Type: Commerical /    In time:4:01  Out time:4:43  Total Treatment Time (min): 42  Visit #: 7 of     Treatment Area: Left shoulder pain [M25.512]    SUBJECTIVE  Pain Level (0-10 scale): 0/10  Any medication changes, allergies to medications, adverse drug reactions, diagnosis change, or new procedure performed?: [x] No    [] Yes (see summary sheet for update)  Subjective functional status/changes:   [] No changes reported  Pt reports no new complaints of pain. Pt reports compliance with HEP. OBJECTIVE    22 min Therapeutic Exercise:  [x] See flow sheet :   Rationale: increase ROM and increase strength to improve the patients ability to tolerate ADLs. 10 min Neuromuscular Re-education:  [x]  See flow sheet :   Rationale: increase strength, improve coordination and increase proprioception  to improve the patients ability to perform functional activities. 10 min Manual Therapy:  PROM Left shoulder   Rationale: decrease pain, increase ROM and increase tissue extensibility to improve tolerance to ADLs. With   [] TE   [] TA   [] neuro   [] other: Patient Education: [x] Review HEP    [] Progressed/Changed HEP based on:   [] positioning   [] body mechanics   [] transfers   [] heat/ice application    [] other:      Other Objective/Functional Measures: Added wall push ups and wall letters     Pain Level (0-10 scale) post treatment: 0/10    ASSESSMENT/Changes in Function: Pt demonstrates good form with updated exercises but continues to have UT compensations with shoulder elevation.      Patient will continue to benefit from skilled PT services to modify and progress therapeutic interventions, address functional mobility deficits, address ROM deficits, address strength deficits, analyze and address soft tissue restrictions, analyze and cue movement patterns and analyze and modify body mechanics/ergonomics to attain remaining goals. []  See Plan of Care  []  See progress note/recertification  []  See Discharge Summary         Progress towards goals / Updated goals:  Updated Goals: to be achieved in 2-4 weeks:                        3. Improve FOTO to predicted outcome for improved ability for functional tasks. FOTO score to 77 (3/5/2018)                        2.  Improve L shoulder PROM IR to 60 degrees for improved ability for ADL. - 55 degrees 2-16-18; Met, 65 degrees.  3/13/2018                        3. Pt will report no difficulty reaching a shelf shoulder height.-met, patient able to perform cone reach at various shelf heights with no difficulty (3/5/2018)                        4. Pt will no difficulty putting on deodorant on the opposite arm. -met, patient with no difficulty (2/26/2018)    PLAN  []  Upgrade activities as tolerated     [x]  Continue plan of care  []  Update interventions per flow sheet       []  Discharge due to:_  []  Other:_      Maria Elena Ayon, MAGDIEL 3/16/2018  4:22 PM    Future Appointments  Date Time Provider Syeda Manzanares   3/20/2018 3:30 PM Rubens Bonilla [address] [facility]   2/4/2019 1:00 PM Heidy Ochoa MD [address] [facility]

## 2018-03-20 ENCOUNTER — HOSPITAL ENCOUNTER (OUTPATIENT)
Dept: PHYSICAL THERAPY | Age: 58
Discharge: HOME | End: 2018-03-20
Payer: COMMERCIAL

## 2018-03-20 PROCEDURE — 97110 THERAPEUTIC EXERCISES: CPT

## 2018-03-20 PROCEDURE — 97530 THERAPEUTIC ACTIVITIES: CPT

## 2018-03-20 NOTE — PROGRESS NOTES
PT DAILY TREATMENT NOTE 3-16    Patient Name: Jasper Campbell  Date:3/20/2018  : 1960  [x]  Patient  Verified  Payor: Jimmy Brewer / Plan: [place] [employer]  [facility]: [facility] /    In time:3:30  Out time:4:10  Total Treatment Time (min): 40  Visit #: 8 of 4-12    Treatment Area: Left shoulder pain [M25.512]    SUBJECTIVE  Pain Level (0-10 scale): 0  Any medication changes, allergies to medications, adverse drug reactions, diagnosis change, or new procedure performed?: [x] No    [] Yes (see summary sheet for update)  Subjective functional status/changes:   [] No changes reported  \"It's feeling a little stiff because of the weather. \"    OBJECTIVE  30 min Therapeutic Exercise:  [x] See flow sheet :   Rationale: increase ROM, increase strength and improve coordination to improve the patients ability to increase ease with ADLs    10 min Therapeutic Activity:  [x]  See flow sheet :   Rationale: increase ROM and improve coordination  to improve the patients ability to increase ease with OH activities    With   [] TE   [] TA   [] neuro   [] other: Patient Education: [x] Review HEP    [] Progressed/Changed HEP based on:   [] positioning   [] body mechanics   [] transfers   [] heat/ice application    [] other:      Other Objective/Functional Measures:   Held manual     Pain Level (0-10 scale) post treatment: 0    ASSESSMENT/Changes in Function:   Patient has made great progress towards updated goals. Will D/C next visit with updated HEP for continued independence. Patient will continue to benefit from skilled PT services to modify and progress therapeutic interventions, address functional mobility deficits, address ROM deficits, address strength deficits, analyze and address soft tissue restrictions, analyze and cue movement patterns, analyze and modify body mechanics/ergonomics and assess and modify postural abnormalities to attain remaining goals.      []  See Plan of Care  []  See progress note/recertification  []  See Discharge Summary         Progress towards goals / Updated goals:  Updated Goals: to be achieved in 2-4 weeks:                        1. Improve FOTO to predicted outcome for improved ability for functional tasks. FOTO score to 77 (3/5/2018)                        2.  Improve L shoulder PROM IR to 60 degrees for improved ability for ADL. - 55 degrees 2-16-18; Met, 65 degrees.  3/13/2018                        3. Pt will report no difficulty reaching a shelf shoulder height.-met, patient able to perform cone reach at various shelf heights with no difficulty (3/5/2018)                        4. Pt will no difficulty putting on deodorant on the opposite arm. -met, patient with no difficulty (2/26/2018)    PLAN  []  Upgrade activities as tolerated     [x]  Continue plan of care  []  Update interventions per flow sheet       []  Discharge due to:_  []  Other:_      Sangeetha Savage 3/20/2018  3:32 PM    Future Appointments  Date Time Provider Syeda Manzanares   3/22/2018 3:00 PM Wen Connor PTA [facility]   2/4/2019 1:00 PM Miller Silverman MD [address] [place] [address]

## 2018-03-23 ENCOUNTER — HOSPITAL ENCOUNTER (OUTPATIENT)
Dept: PHYSICAL THERAPY | Age: 58
Discharge: HOME | End: 2018-03-23
Payer: COMMERCIAL

## 2018-03-23 PROCEDURE — 97110 THERAPEUTIC EXERCISES: CPT

## 2018-03-23 PROCEDURE — 97140 MANUAL THERAPY 1/> REGIONS: CPT

## 2018-03-23 NOTE — PROGRESS NOTES
PT DAILY TREATMENT NOTE     Patient Name: Jamarcus Gaspar  Date:3/23/2018  : 1960  [x]  Patient  Verified  Payor: César Miller / Plan: [place] [employer]  [facility]: [facility] /    In time:3:00  Out time:3:41  Total Treatment Time (min): 41  Visit #: 9 of     Treatment Area: Left shoulder pain [M25.512]    SUBJECTIVE  Pain Level (0-10 scale): 0/10  Any medication changes, allergies to medications, adverse drug reactions, diagnosis change, or new procedure performed?: [x] No    [] Yes (see summary sheet for update)  Subjective functional status/changes:   [] No changes reported  The patient states that his shoulder has felt much better and that he continues to do his exercises at home. OBJECTIVE  33 min Therapeutic Exercise:  [x] See flow sheet :   Rationale: increase ROM and increase strength to improve the patients ability to improve ADL ease. 8 min Manual Therapy:  PROM - flexion/ABD/ER with posterior capsular mobs. Rationale: decrease pain, increase ROM and increase tissue extensibility to improve ADL ease. With   [] TE   [] TA   [] neuro   [] other: Patient Education: [x] Review HEP    [] Progressed/Changed HEP based on:   [] positioning   [] body mechanics   [] transfers   [] heat/ice application    [] other:      Other Objective/Functional Measures: The patient has made excellent capsular mobility gains. With PT and met all goals. Pain Level (0-10 scale) post treatment: 0/10    ASSESSMENT/Changes in Function: D/C due to excellent progress towards goals.     Patient will continue to benefit from skilled PT services to modify and progress therapeutic interventions, address functional mobility deficits, address ROM deficits, address strength deficits, analyze and address soft tissue restrictions, analyze and cue movement patterns, analyze and modify body mechanics/ergonomics, assess and modify postural abnormalities and instruct in home and community integration to attain remaining goals. []  See Plan of Care  []  See progress note/recertification  []  See Discharge Summary         Progress towards goals / Updated goals:  Updated Goals: to be achieved in 2-4 weeks:                        8. Improve FOTO to predicted outcome for improved ability for functional tasks. FOTO score to 77 (3/5/2018)                        2.  Improve L shoulder PROM IR to 60 degrees for improved ability for ADL. - 55 degrees 2-16-18; Met, 65 degrees.  3/13/2018                        3. Pt will report no difficulty reaching a shelf shoulder height.-met, patient able to perform cone reach at various shelf heights with no difficulty (3/5/2018)                        4. Pt will no difficulty putting on deodorant on the opposite arm. -met, patient with no difficulty (2/26/2018)    PLAN  []  Upgrade activities as tolerated     [x]  Continue plan of care  []  Update interventions per flow sheet       []  Discharge due to:_  []  Other:_      Coy Sykes, PT 3/23/2018  4:11 PM    Future Appointments  Date Time Provider Syeda Manzanares   2/4/2019 1:00 PM Heidy Ochoa MD [address] [place] [address]

## 2018-03-23 NOTE — PROGRESS NOTES
[facility]  [address]  [place], [address]  [phone] fax    Physical Therapy Discharge Summary  Patient name: Maya Ibanez Start of Care: 2018   Referral source: Shruthi Powell,* : 1960                          Medical Diagnosis: Left shoulder pain [M25.512] Onset Date:2017                          Treatment Diagnosis: L shoulder adhesive capsulitis and RTC tear   Prior Hospitalization: see medical history Provider#: [id number]   Medications: Verified on Patient summary List    Comorbidities: + smoker   Prior Level of Function: functionally I with daily activities  Visits from Start of Care: 18    Missed Visits: 1  Reporting Period : 2018 to 3/23/2018    Summary of Care:  Updated Goals: to be achieved in 2-4 weeks:                        1. Improve FOTO to predicted outcome for improved ability for functional tasks. FOTO score to 77 (3/5/2018)                        2.  Improve L shoulder PROM IR to 60 degrees for improved ability for ADL. - 55 degrees 2-16-18; Met, 65 degrees. 3/13/2018                        3. Pt will report no difficulty reaching a shelf shoulder height.-met, patient able to perform cone reach at various shelf heights with no difficulty (3/5/2018)                        4. Pt will no difficulty putting on deodorant on the opposite arm. -met, patient with no difficulty (2018)    ASSESSMENT/RECOMMENDATIONS: The patient has met all goals and reports independence with HEP.     [x]Discontinue therapy: [x]Patient has reached or is progressing toward set goals      []Patient is non-compliant or has abdicated      []Due to lack of appreciable progress towards set [address], PT 3/23/2018 4:15 PM

## 2018-04-19 ENCOUNTER — TELEPHONE (OUTPATIENT)
Dept: FAMILY MEDICINE CLINIC | Age: 58
End: 2018-04-19

## 2018-04-19 NOTE — TELEPHONE ENCOUNTER
Patient called and needs a tooth extracted but dentist needs the ok from [place] for the patient to stop all asprin. Can be called in or a letter can be faxed.    [phone] fax   [phone] lplab

## 2018-04-20 NOTE — TELEPHONE ENCOUNTER
Called over in to office requesting. This is not the patient they asked this on. Disregard earlier message.